# Patient Record
Sex: MALE | Race: WHITE | NOT HISPANIC OR LATINO | Employment: OTHER | URBAN - METROPOLITAN AREA
[De-identification: names, ages, dates, MRNs, and addresses within clinical notes are randomized per-mention and may not be internally consistent; named-entity substitution may affect disease eponyms.]

---

## 2017-01-16 RX ORDER — LISINOPRIL AND HYDROCHLOROTHIAZIDE 20; 12.5 MG/1; MG/1
TABLET ORAL
Qty: 30 TABLET | Refills: 2 | Status: SHIPPED | OUTPATIENT
Start: 2017-01-16 | End: 2017-09-01 | Stop reason: SDUPTHER

## 2017-01-31 ENCOUNTER — DOCUMENTATION (OUTPATIENT)
Dept: PHYSICAL THERAPY | Facility: HOSPITAL | Age: 77
End: 2017-01-31

## 2017-01-31 DIAGNOSIS — M25.511 RIGHT ANTERIOR SHOULDER PAIN: Primary | ICD-10-CM

## 2017-09-01 RX ORDER — LISINOPRIL AND HYDROCHLOROTHIAZIDE 20; 12.5 MG/1; MG/1
1 TABLET ORAL DAILY
Qty: 30 TABLET | Refills: 1 | Status: SHIPPED | OUTPATIENT
Start: 2017-09-01 | End: 2017-12-02 | Stop reason: SDUPTHER

## 2017-09-01 RX ORDER — SIMVASTATIN 20 MG
TABLET ORAL
Qty: 30 TABLET | Refills: 1 | Status: SHIPPED | OUTPATIENT
Start: 2017-09-01 | End: 2017-11-01 | Stop reason: SDUPTHER

## 2017-09-06 ENCOUNTER — HOSPITAL ENCOUNTER (EMERGENCY)
Facility: HOSPITAL | Age: 77
Discharge: HOME | End: 2017-09-06
Attending: EMERGENCY MEDICINE | Admitting: EMERGENCY MEDICINE

## 2017-09-06 ENCOUNTER — APPOINTMENT (OUTPATIENT)
Dept: GENERAL RADIOLOGY | Facility: HOSPITAL | Age: 77
End: 2017-09-06

## 2017-09-06 VITALS
RESPIRATION RATE: 18 BRPM | SYSTOLIC BLOOD PRESSURE: 156 MMHG | HEIGHT: 70 IN | WEIGHT: 215 LBS | OXYGEN SATURATION: 94 % | TEMPERATURE: 97.4 F | BODY MASS INDEX: 30.78 KG/M2 | DIASTOLIC BLOOD PRESSURE: 62 MMHG | HEART RATE: 60 BPM

## 2017-09-06 DIAGNOSIS — S40.022A CONTUSION OF LEFT UPPER ARM, INITIAL ENCOUNTER: Primary | ICD-10-CM

## 2017-09-06 DIAGNOSIS — S46.912A STRAIN OF LEFT UPPER ARM, INITIAL ENCOUNTER: ICD-10-CM

## 2017-09-06 PROCEDURE — 73060 X-RAY EXAM OF HUMERUS: CPT

## 2017-09-06 PROCEDURE — 99282 EMERGENCY DEPT VISIT SF MDM: CPT

## 2017-09-06 RX ORDER — NAPROXEN 500 MG/1
500 TABLET ORAL 2 TIMES DAILY PRN
Qty: 24 TABLET | Refills: 0 | Status: SHIPPED | OUTPATIENT
Start: 2017-09-06 | End: 2018-02-28 | Stop reason: SINTOL

## 2017-09-06 NOTE — DISCHARGE INSTRUCTIONS
Rest.  Use sling as needed.  No use of left arm until better.  Naproxen as prescribed for pain.  Apply ice bag off/on next 24 hrs, then switch to heat.  Follow up with Monica and/or Dr. Parrish.

## 2017-09-06 NOTE — ED PROVIDER NOTES
Subjective   HPI Comments: 77-year-old male presents to the emergency department for evaluation of left upper arm pain after a fall.  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  He denies any other injury.  He has pain in the left upper arm on movement of the left arm.  He is left-hand dominant.  Past medical history of hypertension and coronary artery disease with a single stent.  He states he had a heart catheter last year that was normal.    Patient is a 77 y.o. male presenting with fall.   History provided by:  Patient  Fall   Mechanism of injury: fall    Injury location: left upper arm.  Time since incident: 12:55 today.  Fall:     Fall occurred: at work, landed on left shoulder against pavement.    Height of fall:  Standing height    Impact surface:  [place]    Point of impact: left upper arm.  Prior to arrival data:     Patient ambulatory at scene: yes      Responsiveness at scene:  Alert    Loss of consciousness: no    Associated symptoms: no abdominal pain, no back pain, no chest pain, no headaches, no nausea and no vomiting        Review of Systems   Constitutional: Negative for chills and fever.   HENT: Negative for congestion, ear pain, nosebleeds, rhinorrhea and sore throat.    Eyes: Negative for pain, discharge and visual disturbance.   Respiratory: Negative for shortness of breath and wheezing.    Cardiovascular: Negative for chest pain, palpitations and leg swelling.   Gastrointestinal: Negative for abdominal pain, blood in stool, diarrhea, nausea and vomiting.   Endocrine: Negative.    Genitourinary: Negative for dysuria, hematuria and urgency.   Musculoskeletal: Negative for arthralgias and back pain.        Left upper pain     Skin: Negative for pallor, rash and wound.   Allergic/Immunologic: Negative for immunocompromised state.   Neurological: Negative for dizziness, speech  difficulty, weakness and headaches.   Hematological: Negative for adenopathy. Does not bruise/bleed easily.   Psychiatric/Behavioral: Negative.        Past Medical History:   Diagnosis Date   • Hyperlipidemia    • Hypertension    • Ischemic heart disease    • Tobacco use     Remote tobacco use. Quit in 1978.       No Known Allergies    Past Surgical History:   Procedure Laterality Date   • CORONARY ANGIOPLASTY WITH STENT PLACEMENT Left 07/16/2010    70% RCA stenosis treated with 3.0 x 18 mm Cordis Cypher IRENA; LVEF 78%.        History reviewed. No pertinent family history.    Social History     Social History   • Marital status:      Spouse name: N/A   • Number of children: N/A   • Years of education: N/A     Social History Main Topics   • Smoking status: Former Smoker     Packs/day: 0.50     Years: 20.00     Types: Cigarettes     Quit date: 6/1/1978   • Smokeless tobacco: None   • Alcohol use None   • Drug use: None   • Sexual activity: Not Asked     Other Topics Concern   • None     Social History Narrative           Objective   Physical Exam   Constitutional: He is oriented to person, place, and time. He appears well-developed and well-nourished. No distress.   HENT:   Head: Normocephalic and atraumatic.   Nose: Nose normal.   Mouth/Throat: Oropharynx is clear and moist.   Eyes: EOM are normal. Pupils are equal, round, and reactive to light. No scleral icterus.   Neck: Normal range of motion. Neck supple.   Cardiovascular: Normal rate, regular rhythm, normal heart sounds and intact distal pulses.    No murmur heard.  Pulmonary/Chest: Effort normal and breath sounds normal. No respiratory distress. He has no wheezes. He has no rales. He exhibits no tenderness.   Abdominal: Soft. Bowel sounds are normal. There is no tenderness.   Musculoskeletal: Normal range of motion. He exhibits tenderness (tenderness on palpation over left upper arm;  pain on ROM.  No deformity.  No abrasions or bruising.). He exhibits no  edema.   Neurological: He is alert and oriented to person, place, and time.   Skin: Skin is warm and dry. No rash noted. He is not diaphoretic.   Psychiatric: He has a normal mood and affect.   Nursing note and vitals reviewed.      Procedures         ED Course  ED Course   6:13 PM  No acute findings on xray.  Will d/c pt in sling and give rx for Naproxen and have f/u with [facility] and/or ortho.              MDM    Final diagnoses:   Contusion of left upper arm, initial encounter   Strain of left upper arm, initial encounter            GRACE García  09/06/17 [id number]

## 2017-09-11 ENCOUNTER — TRANSCRIBE ORDERS (OUTPATIENT)
Dept: PHYSICAL THERAPY | Facility: CLINIC | Age: 77
End: 2017-09-11

## 2017-09-11 DIAGNOSIS — M25.512 ACUTE PAIN OF LEFT SHOULDER: Primary | ICD-10-CM

## 2017-09-11 DIAGNOSIS — M54.2 CERVICAL PAIN: ICD-10-CM

## 2017-09-13 ENCOUNTER — TREATMENT (OUTPATIENT)
Dept: PHYSICAL THERAPY | Facility: CLINIC | Age: 77
End: 2017-09-13

## 2017-09-13 DIAGNOSIS — M25.512 ACUTE PAIN OF LEFT SHOULDER: Primary | ICD-10-CM

## 2017-09-13 PROCEDURE — 97110 THERAPEUTIC EXERCISES: CPT | Performed by: PHYSICAL THERAPIST

## 2017-09-13 PROCEDURE — 97001 PR PHYS THERAPY EVALUATION: CPT | Performed by: PHYSICAL THERAPIST

## 2017-09-13 NOTE — PROGRESS NOTES
Physical Therapy Initial Evaluation and Plan of Care    TOTAL TIME: 60 MINUTES    Subjective Evaluation    History of Present Illness  Date of onset: 2017  Mechanism of injury: WENT TO ER AFTER A FALL ON TO THE LEFT SHOULDER    PAIN WITH ATTEMPTING TO RAISE ARM        Patient Occupation: WORKS FOR [employer] Work Restrictions: OFF WORKQuality of life: fair    Pain  Current pain ratin  At best pain ratin  At worst pain ratin  Relieving factors: medications, change in position, rest, ice and heat  Aggravating factors: sleeping    Diagnostic Tests  X-ray: normal    Patient Goals  Patient goals for therapy: decreased pain, return to work, independence with ADLs/IADLs, increased strength and increased motion             Objective     Palpation   Left   Tenderness of the infraspinatus and supraspinatus.     Tenderness     Left Shoulder   Tenderness in the infraspinatus tendon and supraspinatus tendon. No tenderness in the AC joint, bicipital groove, lateral scapula, medial scapula, scapular spine and SC joint.     Cervical/Thoracic Screen   Cervical range of motion within normal limits    Neurological Testing     Reflexes   Left   Biceps (C5/C6): trace (1+)  Brachioradialis (C6): trace (1+)  Triceps (C7): trace (1+)    Right   Biceps (C5/C6): trace (1+)  Brachioradialis (C6): trace (1+)  Triceps (C7): trace (1+)    Active Range of Motion   Left Shoulder   Flexion: 100 degrees with pain  Abduction: 45 degrees with pain  External rotation 0°: 60 degrees with pain  External rotation BTH: C6 with pain  Internal rotation BTB: L1     Strength/Myotome Testing     Left Shoulder     Planes of Motion   Flexion: 2-   Abduction: 2-   External rotation at 0°: 2   Internal rotation at 0°: 4     Tests     Left Shoulder   Positive active compression ([place]), empty can and painful arc.   Negative drop arm and lift-off.          Assessment & Plan     Assessment  Impairments: abnormal or restricted  ROM, activity intolerance, impaired physical strength, lacks appropriate home exercise program and pain with function  Assessment details: S/S CONSISTENT WITH RTC STRAIN LEFT SHOULDER, POSSIBLE TEAR AFTER A FALL; NEEDS PT TO RESTORE FULL ROM AND STRENGTH IN ORDER TO RTW WITH FULL FUNCTION    Prognosis: poor  Prognosis details: DIFFICULTY WITH AROM OF ARM, VERY WEAK RIGHT EXT ROT STRENGTH-MAY INDICATE RTC TEAR  Functional Limitations: carrying objects, lifting, pulling, pushing, uncomfortable because of pain, reaching behind back and reaching overhead  Goals  Plan Goals: GOALS:  2 WEEKS  1. IND WITH HEP  2. FULL AROM WITHOUT PAIN\    4 WEEKS:  1. ABLE TO DEMONSTRATE 4/5 LEFT SHOULDER STRENGTH  2. ABLE TO PERFORM WORK SIMULATION TASKS SUCH AS DRIVING, LIFTING, CARRYING WITHOUT PAIN    Plan  Therapy options: will be seen for skilled physical therapy services  Planned modality interventions: iontophoresis, TENS, ultrasound, thermotherapy (hydrocollator packs), high voltage pulsed current (pain management), electrical stimulation/Russian stimulation and cryotherapy  Other planned modality interventions: DRY NEEDLING  Planned therapy interventions: manual therapy, soft tissue mobilization, strengthening, spinal/joint mobilization, stretching, therapeutic activities, joint mobilization, functional ROM exercises, flexibility and neuromuscular re-education  Frequency: 2x week  Duration in weeks: 4  Treatment plan discussed with: patient and caregiver        Manual Therapy:         mins  86334;  Therapeutic Exercise:    30     mins  79355;     Neuromuscular Rahel:        mins  80085;    Therapeutic Activity:          mins  64524;     Gait Training:           mins  68416;     Ultrasound:          mins  10729;    Electrical Stimulation:         mins  52531 ( );  Dry Needling          mins self-pay    Timed Treatment:   30   mins   Total Treatment:     60   mins    PT SIGNATURE: Melvin Rodgers, DA   DATE TREATMENT  INITIATED: 9/13/2017    Initial Certification  Certification Period: 12/12/2017  I certify that the therapy services are furnished while this patient is under my care.  The services outlined above are required by this patient, and will be reviewed every 90 days.     PHYSICIAN: Storm Stevenson MD      DATE:     Please sign and return via fax to  .. Thank you, [facility].

## 2017-09-15 ENCOUNTER — TREATMENT (OUTPATIENT)
Dept: PHYSICAL THERAPY | Facility: CLINIC | Age: 77
End: 2017-09-15

## 2017-09-15 DIAGNOSIS — M25.512 ACUTE PAIN OF LEFT SHOULDER: Primary | ICD-10-CM

## 2017-09-15 PROCEDURE — 97110 THERAPEUTIC EXERCISES: CPT | Performed by: PHYSICAL THERAPIST

## 2017-09-18 ENCOUNTER — TREATMENT (OUTPATIENT)
Dept: PHYSICAL THERAPY | Facility: CLINIC | Age: 77
End: 2017-09-18

## 2017-09-18 DIAGNOSIS — M25.512 ACUTE PAIN OF LEFT SHOULDER: Primary | ICD-10-CM

## 2017-09-18 PROCEDURE — 97110 THERAPEUTIC EXERCISES: CPT | Performed by: PHYSICAL THERAPIST

## 2017-09-18 PROCEDURE — 97140 MANUAL THERAPY 1/> REGIONS: CPT | Performed by: PHYSICAL THERAPIST

## 2017-09-18 NOTE — PROGRESS NOTES
Physical Therapy Daily Progress Note    TOTAL TIME:  MINUTES    Jef Fairbanksaniel reports: SHOULDER WAS SORE AFTER LAST TREATMENT BUT HAS FELT FINE THE LAST COUPLE OF DAYS; SEE THE MD AGAIN ON Wednesday       Objective   See Exercise, Manual, and Modality Logs for complete treatment.     THERAPEUTIC EXERCISES/ACTIVITIES ADDED TODAY: SHOULDER ISOMETRICS ADD/ER/FLEXION; UBE FWD/RETRO    MANUAL PROM LEFT SHOULDER X 10 MINUTES    Assessment/Plan  SUSPICIOUS OF POSSIBLE RTC TEAR - GREAT DIFFICULTY TO RAISE ARM AND VERY WEAK RTC WITH ER STRENGTH; GOOD PROM    Progress per Plan of Care           Manual Therapy:    10     mins  53684;  Therapeutic Exercise:    40     mins  29198;     Neuromuscular Rahel:        mins  92468;    Therapeutic Activity:          mins  36336;     Gait Training:           mins  41918;     Ultrasound:          mins  38052;    Electrical Stimulation:         mins  01311 ( );  Dry Needling          mins self-pay    Timed Treatment:   50   mins   Total Treatment:     60   mins    Melvin Rodgers, PT  Physical Therapist

## 2017-09-20 ENCOUNTER — TREATMENT (OUTPATIENT)
Dept: PHYSICAL THERAPY | Facility: CLINIC | Age: 77
End: 2017-09-20

## 2017-09-20 DIAGNOSIS — M25.512 ACUTE PAIN OF LEFT SHOULDER: Primary | ICD-10-CM

## 2017-09-20 PROCEDURE — 97110 THERAPEUTIC EXERCISES: CPT | Performed by: PHYSICAL THERAPIST

## 2017-09-20 NOTE — PROGRESS NOTES
Physical Therapy Daily Progress Note    TOTAL TIME: 50 MINUTES    Jef Mcarthur reports: SHOULDER A LITTLE SORE THIS AM; PAIN SEEMS TO HAVE MOVED FROM ARM UP IN TO THE SHOULDER NOW; F/U WITH THE MD THIS AM      Objective   See Exercise, Manual, and Modality Logs for complete treatment.     THERAPEUTIC EXERCISES/ACTIVITIES ADDED TODAY: BALL ON WALL WALKS, YELLOW TBAND FLEXION, ADD, EXT    Assessment/Plan  ABLE TO PERFORM YELLOW TBAND TODAY WITHOUT PAIN; PATIENT NEEDS CONTINUED PHYSICAL THERAPY IN ORDER TO ACHIEVE FULL ROM, STRENGTH AND FUNCTION WITH NO REPORTS OF PAIN IN ORDER TO RTW WITHOUT RESTRICTIONS AND WITHOUT PAIN OR DYSFUNCTION       Progress per Plan of Care           Manual Therapy:         mins  67196;  Therapeutic Exercise:    50     mins  06153;     Neuromuscular Rahel:        mins  47495;    Therapeutic Activity:          mins  23429;     Gait Training:           mins  21880;     Ultrasound:          mins  05001;    Electrical Stimulation:         mins  78405 ( );  Dry Needling          mins self-pay    Timed Treatment:   50   mins   Total Treatment:     50   mins    Melvin Rodgers PT  Physical Therapist

## 2017-09-22 ENCOUNTER — TREATMENT (OUTPATIENT)
Dept: PHYSICAL THERAPY | Facility: CLINIC | Age: 77
End: 2017-09-22

## 2017-09-22 DIAGNOSIS — M25.512 ACUTE PAIN OF LEFT SHOULDER: Primary | ICD-10-CM

## 2017-09-22 PROCEDURE — 97110 THERAPEUTIC EXERCISES: CPT | Performed by: PHYSICAL THERAPIST

## 2017-09-22 NOTE — PROGRESS NOTES
Physical Therapy Daily Progress Note    TOTAL TIME: 60 MINUTES    Jef Mcarthur reports: SHOULDER IS STARTING TO FEEL A LITTLE BIT BETTER      Objective   See Exercise, Manual, and Modality Logs for complete treatment.     THERAPEUTIC EXERCISES/ACTIVITIES ADDED TODAY: NA    Assessment/Plan  PATIENT HAS FEWER COMPLAINTS OF PAIN; FUNCTION IMPROVING SLOWLY; PATIENT NEEDS CONTINUED PHYSICAL THERAPY IN ORDER TO ACHIEVE FULL ROM, STRENGTH AND FUNCTION WITH NO REPORTS OF PAIN IN ORDER TO RTW WITHOUT RESTRICTIONS AND WITHOUT PAIN OR DYSFUNCTION       Progress per Plan of Care           Manual Therapy:         mins  44393;  Therapeutic Exercise:    50     mins  53471;     Neuromuscular Rahel:        mins  38300;    Therapeutic Activity:          mins  67549;     Gait Training:           mins  89689;     Ultrasound:          mins  35175;    Electrical Stimulation:         mins  12792 ( );  Dry Needling          mins self-pay    Timed Treatment:   50   mins   Total Treatment:     60   mins    Melvin Rodgers PT  Physical Therapist

## 2017-09-27 ENCOUNTER — TREATMENT (OUTPATIENT)
Dept: PHYSICAL THERAPY | Facility: CLINIC | Age: 77
End: 2017-09-27

## 2017-09-27 PROCEDURE — 97110 THERAPEUTIC EXERCISES: CPT | Performed by: PHYSICAL THERAPIST

## 2017-09-27 NOTE — PROGRESS NOTES
Physical Therapy Daily Progress Note    TOTAL TIME: 80 MINUTES    Jef Mcarthur reports: HAD TO CANCEL ON Monday, WIFE WAS ILL; SHOULDER SEEMS TO BE IMPROVING BUT STILL PAINFUL AT TIMES      Objective   See Exercise, Manual, and Modality Logs for complete treatment.     THERAPEUTIC EXERCISES/ACTIVITIES ADDED TODAY: INCLINE SLIDES ON WALL WITH BOARD (FLEXION AND ADD), SL ER 0#, SUPINE CANE ROM MULTIPLE ANGLES    Assessment/Plan  MAKING GOOD PROGRESS, WORKING HARD IN THERAPY; PATIENT NEEDS CONTINUED PHYSICAL THERAPY IN ORDER TO ACHIEVE FULL ROM, STRENGTH AND FUNCTION WITH NO REPORTS OF PAIN IN ORDER TO RTW WITHOUT RESTRICTIONS AND WITHOUT PAIN OR DYSFUNCTION         Progress per Plan of Care           Manual Therapy:    10     mins  65929;  Therapeutic Exercise:    60     mins  29816;     Neuromuscular Rahel:        mins  54030;    Therapeutic Activity:          mins  39628;     Gait Training:           mins  35421;     Ultrasound:          mins  09961;    Electrical Stimulation:         mins  45130 ( );  Dry Needling          mins self-pay    Timed Treatment:   70   mins   Total Treatment:     80   mins    Melvin Rodgers, PT  Physical Therapist

## 2017-09-29 ENCOUNTER — TREATMENT (OUTPATIENT)
Dept: PHYSICAL THERAPY | Facility: CLINIC | Age: 77
End: 2017-09-29

## 2017-09-29 DIAGNOSIS — M25.512 ACUTE PAIN OF LEFT SHOULDER: Primary | ICD-10-CM

## 2017-09-29 PROCEDURE — 97140 MANUAL THERAPY 1/> REGIONS: CPT | Performed by: PHYSICAL THERAPIST

## 2017-09-29 PROCEDURE — 97110 THERAPEUTIC EXERCISES: CPT | Performed by: PHYSICAL THERAPIST

## 2017-10-02 ENCOUNTER — TREATMENT (OUTPATIENT)
Dept: PHYSICAL THERAPY | Facility: CLINIC | Age: 77
End: 2017-10-02

## 2017-10-02 DIAGNOSIS — M25.512 ACUTE PAIN OF LEFT SHOULDER: Primary | ICD-10-CM

## 2017-10-02 PROCEDURE — 97110 THERAPEUTIC EXERCISES: CPT | Performed by: PHYSICAL THERAPIST

## 2017-10-02 NOTE — PROGRESS NOTES
Physical Therapy Daily Progress Note    TOTAL TIME: 70 MINUTES    Jef Mcarthur reports: CONTINUED SHOULDER PAIN, BUT OVERALL BETTER      Objective   See Exercise, Manual, and Modality Logs for complete treatment.     THERAPEUTIC EXERCISES/ACTIVITIES ADDED TODAY: NA    Assessment/Plan  RTC WEAKNESS, PAIN WITH SOME ELEVATION AND ABDUCTION TASKS    Progress per Plan of Care           Manual Therapy:    10     mins  77852;  Therapeutic Exercise:    50     mins  74633;     Neuromuscular Rahel:        mins  24043;    Therapeutic Activity:          mins  71008;     Gait Training:           mins  85259;     Ultrasound:          mins  39151;    Electrical Stimulation:         mins  08285 ( );  Dry Needling          mins self-pay    Timed Treatment:   60   mins   Total Treatment:     70   mins    Melvin Rodgers, PT  Physical Therapist

## 2017-10-03 NOTE — PROGRESS NOTES
Physical Therapy Daily Progress Note    TOTAL TIME: 90 MINUTES    Jef Mcarthur reports: SHOULDER STILL VERY WEAK, SORE AT TIMES; F/U WITH MD ON WEDNESDAY      Objective   See Exercise, Manual, and Modality Logs for complete treatment.     THERAPEUTIC EXERCISES/ACTIVITIES ADDED TODAY: CANE SUPINE ROM MULTIPLE ANGLES    Assessment/Plan  WEAKNESS WITH ACTIVE FLEXION AND ER MAY INDICATE RTC PATHOLOGY; PATIENT NEEDS CONTINUED PHYSICAL THERAPY IN ORDER TO ACHIEVE FULL ROM, STRENGTH AND FUNCTION WITH NO REPORTS OF PAIN IN ORDER TO RTW WITHOUT RESTRICTIONS AND WITHOUT PAIN OR DYSFUNCTION       Progress per Plan of Care           Manual Therapy:         mins  55970;  Therapeutic Exercise:    75     mins  07719;     Neuromuscular Rahel:        mins  25039;    Therapeutic Activity:          mins  69411;     Gait Training:           mins  65826;     Ultrasound:          mins  29493;    Electrical Stimulation:         mins  59952 ( );  Dry Needling          mins self-pay    Timed Treatment:   75   mins   Total Treatment:     90   mins    Melvin Rodgers, PT  Physical Therapist

## 2017-10-30 ENCOUNTER — OFFICE VISIT (OUTPATIENT)
Dept: ORTHOPEDIC SURGERY | Facility: CLINIC | Age: 77
End: 2017-10-30

## 2017-10-30 VITALS
HEIGHT: 67 IN | SYSTOLIC BLOOD PRESSURE: 156 MMHG | BODY MASS INDEX: 33.43 KG/M2 | WEIGHT: 213 LBS | DIASTOLIC BLOOD PRESSURE: 72 MMHG | HEART RATE: 69 BPM

## 2017-10-30 DIAGNOSIS — S46.012A RUPTURE OF LEFT SUPRASPINATUS TENDON, INITIAL ENCOUNTER: ICD-10-CM

## 2017-10-30 DIAGNOSIS — R52 PAIN: Primary | ICD-10-CM

## 2017-10-30 PROCEDURE — 99204 OFFICE O/P NEW MOD 45 MIN: CPT | Performed by: ORTHOPAEDIC SURGERY

## 2017-10-30 RX ORDER — ACETAMINOPHEN 500 MG
1000 TABLET ORAL DAILY
COMMUNITY

## 2017-11-01 RX ORDER — SIMVASTATIN 20 MG
TABLET ORAL
Qty: 30 TABLET | Refills: 0 | Status: SHIPPED | OUTPATIENT
Start: 2017-11-01 | End: 2017-12-02 | Stop reason: SDUPTHER

## 2017-11-06 ENCOUNTER — TREATMENT (OUTPATIENT)
Dept: PHYSICAL THERAPY | Facility: CLINIC | Age: 77
End: 2017-11-06

## 2017-11-06 DIAGNOSIS — M25.512 ACUTE PAIN OF LEFT SHOULDER: Primary | ICD-10-CM

## 2017-11-06 PROCEDURE — 97110 THERAPEUTIC EXERCISES: CPT | Performed by: PHYSICAL THERAPIST

## 2017-11-06 NOTE — PROGRESS NOTES
Physical Therapy Daily Progress Note/ RE-ASSESSMENT     TOTAL TIME: 75 MINUTES    Jef Mcarthur reports: very pleasant gentleman returns to PT today after getting an MRI and seeing Dr. Juno Ochoa for left shoulder pain after a fall at work; works for [employer] as a       Objective   See Exercise, Manual, and Modality Logs for complete treatment.     AROM:   scaption 140 degrees  IR: T6  ABD: 150 degrees  ER: T3 behind the head     strength: 70 # bilaterally (mean for age and gender is 65.7 on right, 55# on left)    MMT: shoulder flexion/abd/IR all 4/5, ER + 2/5    THERAPEUTIC EXERCISES/ACTIVITIES ADDED TODAY: see flow sheets    Assessment/Plan  MRI reveals SS/IS massive cuff tears but patient's ROM and strength have improved greatly since starting therapy; needs continue PT to address strength and function    Goals:   1. MMT of ER to > 3+/5 without pain  2. Full AROM left shoulder with functional movements without pain or compensation  3. MMT flexion, IR, ABD to 5/5   4. Able to perform ADL's without pain or compensation    Progress per Plan of Care           Manual Therapy:         mins  93303;  Therapeutic Exercise:    55     mins  77561;     Neuromuscular Rahel:        mins  92555;    Therapeutic Activity:          mins  68067;     Gait Training:           mins  71128;     Ultrasound:          mins  38148;    Electrical Stimulation:         mins  19049 ( );  Dry Needling          mins self-pay    Timed Treatment:   55   mins   Total Treatment:     75   mins    Melvin Rodgers, PT  Physical Therapist

## 2017-11-08 ENCOUNTER — TREATMENT (OUTPATIENT)
Dept: PHYSICAL THERAPY | Facility: CLINIC | Age: 77
End: 2017-11-08

## 2017-11-08 DIAGNOSIS — M25.512 ACUTE PAIN OF LEFT SHOULDER: Primary | ICD-10-CM

## 2017-11-08 PROCEDURE — 97110 THERAPEUTIC EXERCISES: CPT | Performed by: PHYSICAL THERAPIST

## 2017-11-08 NOTE — PROGRESS NOTES
Physical Therapy Daily Progress Note    TOTAL TIME: 80 MINUTES    Jef Mcarthur reports: no new complaints; shoulder feels a little stronger      Objective   See Exercise, Manual, and Modality Logs for complete treatment.     THERAPEUTIC EXERCISES/ACTIVITIES ADDED TODAY: see flow sheets    Assessment/Plan  Working very hard in therapy, making good progress thus far; needs to continue with PT towards established goals    Progress per Plan of Care           Manual Therapy:         mins  29420;  Therapeutic Exercise:    80     mins  89208;     Neuromuscular Rahel:        mins  28410;    Therapeutic Activity:          mins  90738;     Gait Training:           mins  08842;     Ultrasound:          mins  28029;    Electrical Stimulation:         mins  15114 ( );  Dry Needling          mins self-pay    Timed Treatment:   80   mins   Total Treatment:     80   mins    Melvin Rodgers, PT  Physical Therapist

## 2017-11-13 ENCOUNTER — TREATMENT (OUTPATIENT)
Dept: PHYSICAL THERAPY | Facility: CLINIC | Age: 77
End: 2017-11-13

## 2017-11-13 DIAGNOSIS — M25.512 ACUTE PAIN OF LEFT SHOULDER: Primary | ICD-10-CM

## 2017-11-13 PROCEDURE — 97110 THERAPEUTIC EXERCISES: CPT | Performed by: PHYSICAL THERAPIST

## 2017-11-13 NOTE — PROGRESS NOTES
Physical Therapy Daily Progress Note    TOTAL TIME: 75 MINUTES    Jef Mcarthur reports: feels stronger; continued intermittent soreness      Objective   See Exercise, Manual, and Modality Logs for complete treatment.     THERAPEUTIC EXERCISES/ACTIVITIES ADDED TODAY: 2# med ball on wall taps 3 x 1 minute; swiss ball AROM in scapular plane through full range 2x10 bilaterally    Assessment/Plan  Patient is working very hard in therapy and making good progress; continued ER weakness with arm at side; good AROM left UE; could benefit from continued PT to improve strength and function    Progress per Plan of Care           Manual Therapy:         mins  81707;  Therapeutic Exercise:    75     mins  97828;     Neuromuscular Rahel:        mins  56377;    Therapeutic Activity:          mins  47620;     Gait Training:           mins  61997;     Ultrasound:          mins  89320;    Electrical Stimulation:         mins  31781 ( );  Dry Needling          mins self-pay    Timed Treatment:   75   mins   Total Treatment:     75   mins    Melvin Rodgers PT  Physical Therapist

## 2017-11-15 ENCOUNTER — TREATMENT (OUTPATIENT)
Dept: PHYSICAL THERAPY | Facility: CLINIC | Age: 77
End: 2017-11-15

## 2017-11-15 DIAGNOSIS — M25.512 ACUTE PAIN OF LEFT SHOULDER: Primary | ICD-10-CM

## 2017-11-15 PROCEDURE — 97110 THERAPEUTIC EXERCISES: CPT | Performed by: PHYSICAL THERAPIST

## 2017-11-15 NOTE — PROGRESS NOTES
Physical Therapy Daily Progress Note    TOTAL TIME: 75 MINUTES    Jef Mcarthur reports: will see the MD on Monday; arm feels stronger, continued weakness in ER      Objective   See Exercise, Manual, and Modality Logs for complete treatment.     THERAPEUTIC EXERCISES/ACTIVITIES ADDED TODAY: see flow sheets    Assessment/Plan  Very pleasant gentleman who is working very hard in therapy to improve the function of his left shoulder; good AROM all planes; weakness with ER with arm at the side; has made excellent progress with therapy; could continue with therapy and progress to work simulated tasks, as patient voices that he would like to return to work if possible; would likely have difficulty with overhead tasks    Awaiting MD orders           Manual Therapy:         mins  78326;  Therapeutic Exercise:    75     mins  32262;     Neuromuscular Rahel:        mins  84827;    Therapeutic Activity:          mins  58852;     Gait Training:           mins  38726;     Ultrasound:          mins  17839;    Electrical Stimulation:         mins  64010 ( );  Dry Needling          mins self-pay    Timed Treatment:   75   mins   Total Treatment:     75   mins    Melvin Rodgers PT  Physical Therapist

## 2017-11-17 ENCOUNTER — OFFICE VISIT (OUTPATIENT)
Dept: FAMILY MEDICINE CLINIC | Facility: CLINIC | Age: 77
End: 2017-11-17

## 2017-11-17 VITALS
SYSTOLIC BLOOD PRESSURE: 144 MMHG | TEMPERATURE: 96.5 F | HEART RATE: 62 BPM | WEIGHT: 219.8 LBS | OXYGEN SATURATION: 98 % | HEIGHT: 67 IN | DIASTOLIC BLOOD PRESSURE: 82 MMHG | BODY MASS INDEX: 34.5 KG/M2

## 2017-11-17 DIAGNOSIS — E66.09 CLASS 1 OBESITY DUE TO EXCESS CALORIES WITHOUT SERIOUS COMORBIDITY WITH BODY MASS INDEX (BMI) OF 34.0 TO 34.9 IN ADULT: ICD-10-CM

## 2017-11-17 DIAGNOSIS — G89.29 CHRONIC MIDLINE LOW BACK PAIN, WITH SCIATICA PRESENCE UNSPECIFIED: Primary | ICD-10-CM

## 2017-11-17 DIAGNOSIS — M54.5 CHRONIC MIDLINE LOW BACK PAIN, WITH SCIATICA PRESENCE UNSPECIFIED: Primary | ICD-10-CM

## 2017-11-17 DIAGNOSIS — G89.29 CHRONIC LEFT SHOULDER PAIN: ICD-10-CM

## 2017-11-17 DIAGNOSIS — Z23 NEED FOR IMMUNIZATION AGAINST INFLUENZA: ICD-10-CM

## 2017-11-17 DIAGNOSIS — M25.512 CHRONIC LEFT SHOULDER PAIN: ICD-10-CM

## 2017-11-17 DIAGNOSIS — R73.09 ELEVATED GLUCOSE: ICD-10-CM

## 2017-11-17 PROBLEM — E66.811 CLASS 1 OBESITY DUE TO EXCESS CALORIES WITHOUT SERIOUS COMORBIDITY WITH BODY MASS INDEX (BMI) OF 34.0 TO 34.9 IN ADULT: Status: ACTIVE | Noted: 2017-11-17

## 2017-11-17 LAB — HBA1C MFR BLD: 5.5 %

## 2017-11-17 PROCEDURE — 83036 HEMOGLOBIN GLYCOSYLATED A1C: CPT | Performed by: NURSE PRACTITIONER

## 2017-11-17 PROCEDURE — 90662 IIV NO PRSV INCREASED AG IM: CPT | Performed by: NURSE PRACTITIONER

## 2017-11-17 PROCEDURE — 99214 OFFICE O/P EST MOD 30 MIN: CPT | Performed by: NURSE PRACTITIONER

## 2017-11-17 PROCEDURE — 90471 IMMUNIZATION ADMIN: CPT | Performed by: NURSE PRACTITIONER

## 2017-11-17 NOTE — PROGRESS NOTES
Chief Complaint   Patient presents with   • Shoulder Pain     Left shoulder    • Sciatica   • Hyperglycemia     Labs showed glucose was a little elevated. A1c check.       Subjective   Jef Mcarthur is a 77 y.o. male    Hyperglycemia   This is a new problem. Episode onset: last Glucose, was elevated, 104, Was 112 time before. Checked A1c today, was 5.5. The problem has been unchanged. Associated symptoms include arthralgias. Pertinent negatives include no abdominal pain, chest pain, chills, coughing, fatigue, headaches, nausea, rash or weakness. Associated symptoms comments: No symptoms. Nothing aggravates the symptoms. He has tried nothing for the symptoms. The treatment provided no relief.     Left Shoulder pain- Seeing ortho, Has torn rotator cuff    Right Sciatica- Has been there since at least 12/2016, has been to [facility] and used NSAIDs which have improved pain.     Anxiety- Has brother in law, homeless, lives with them off and on, Hiked and fractured leg, Now been with them for 6 weeks, He has drinking problem, Has been with them off and on for last 3 yrs. Causing some problems with him and wife.       No Known Allergies  Past Medical History:   Diagnosis Date   • Anxiety    • Colon polyps    • Hyperlipidemia    • Hypertension    • Ischemic heart disease    • Tobacco use     Remote tobacco use. Quit in 1978.      Past Surgical History:   Procedure Laterality Date   • CORONARY ANGIOPLASTY WITH STENT PLACEMENT Left 07/16/2010    70% RCA stenosis treated with 3.0 x 18 mm Cordis Cypher IRENA; LVEF 78%.    • TONSILLECTOMY       Social History     Social History   • Marital status:      Spouse name: N/A   • Number of children: N/A   • Years of education: N/A     Occupational History   • Not on file.     Social History Main Topics   • Smoking status: Former Smoker     Packs/day: 0.50     Years: 17.00     Types: Cigarettes     Start date: 1961     Quit date: 6/1/1978   • Smokeless tobacco: Never Used   •  Alcohol use Yes      Comment: rare   • Drug use: No   • Sexual activity: Defer     Other Topics Concern   • Not on file     Social History Narrative        Current Outpatient Prescriptions:   •  acetaminophen (TYLENOL) 500 MG tablet, Take 1,000 mg by mouth Daily., Disp: , Rfl:   •  aspirin 81 MG EC tablet, Take 81 mg by mouth daily., Disp: , Rfl:   •  lisinopril-hydrochlorothiazide (PRINZIDE,ZESTORETIC) 20-12.5 MG per tablet, Take 1 tablet by mouth Daily., Disp: 30 tablet, Rfl: 1  •  naproxen (NAPROSYN) 500 MG tablet, Take 1 tablet by mouth 2 (Two) Times a Day As Needed for Mild Pain ., Disp: 24 tablet, Rfl: 0  •  pantoprazole (PROTONIX) 40 MG EC tablet, TAKE ONE TABLET BY MOUTH DAILY, Disp: 30 tablet, Rfl: 4  •  simvastatin (ZOCOR) 20 MG tablet, TAKE ONE TABLET BY MOUTH DAILY, Disp: 30 tablet, Rfl: 0     Review of Systems   Constitutional: Negative for chills, fatigue and unexpected weight change.   HENT: Positive for hearing loss.    Respiratory: Negative for cough, chest tightness and shortness of breath.    Cardiovascular: Negative for chest pain, palpitations and leg swelling.   Gastrointestinal: Positive for constipation (some better with ficercon and stool softner). Negative for abdominal pain, diarrhea and nausea.   Genitourinary: Negative for difficulty urinating and dysuria.   Musculoskeletal: Positive for arthralgias and back pain.   Skin: Negative for color change and rash.   Neurological: Negative for dizziness, syncope, weakness and headaches.   Psychiatric/Behavioral: Positive for sleep disturbance (some pain makes stay awake, Melatonin helps some, Naproxen helps sleep some.). The patient is nervous/anxious (did not feel right with Hydroxyzine).        Objective     Vitals:    11/17/17 0833   BP: 144/82   Pulse: 62   Temp: 96.5 °F (35.8 °C)   SpO2: 98%       Results for orders placed or performed in visit on 11/17/17   POC Glycosylated Hemoglobin (Hb A1C)   Result Value Ref Range    Hemoglobin A1C 5.5  %       Physical Exam   Constitutional: He is oriented to person, place, and time. He appears well-developed and well-nourished. No distress.   Neck: No JVD present.   Cardiovascular: Normal rate, regular rhythm, normal heart sounds and intact distal pulses.    No murmur heard.  Pulmonary/Chest: Effort normal and breath sounds normal. No respiratory distress. He exhibits no tenderness.   Abdominal: Soft. He exhibits no distension. There is no tenderness.   Musculoskeletal: He exhibits tenderness (left shoulder, ). He exhibits no edema.   Neurological: He is alert and oriented to person, place, and time.   Skin: Skin is warm and dry. He is not diaphoretic. No erythema. No pallor.   Psychiatric: He has a normal mood and affect. His behavior is normal.   Nursing note and vitals reviewed.      Assessment/Plan   Problem List Items Addressed This Visit        Digestive    Class 1 obesity due to excess calories without serious comorbidity with body mass index (BMI) of 34.0 to 34.9 in adult       Nervous and Auditory    Low back pain - Primary    Chronic left shoulder pain      Other Visit Diagnoses     Elevated glucose        Relevant Orders    POC Glycosylated Hemoglobin (Hb A1C) (Completed)    Need for immunization against influenza        Relevant Orders    Flu Vaccine High Dose PF 65YR+ (Completed)      For pain- Cont to see ortho and take NSAIDs.     Glucose- A1c fine today.     Weight-Discussed need for wt loss. Pt advised to eat a heart healthy diet and get regular aerobic exercise. Set goal of 5 lbs by [place], discussed need for cheat days or meals 1 per month.     Gave flu shot today    Counseling provided on weight management.    Gilberto Comer, ODELL   11/19/2017   3:13 PM

## 2017-11-17 NOTE — PATIENT INSTRUCTIONS
Calorie Counting for Weight Loss  Calories are energy you get from the things you eat and drink. Your body uses this energy to keep you going throughout the day. The number of calories you eat affects your weight. When you eat more calories than your body needs, your body stores the extra calories as fat. When you eat fewer calories than your body needs, your body burns fat to get the energy it needs.  Calorie counting means keeping track of how many calories you eat and drink each day. If you make sure to eat fewer calories than your body needs, you should lose weight. In order for calorie counting to work, you will need to eat the number of calories that are right for you in a day to lose a healthy amount of weight per week. A healthy amount of weight to lose per week is usually 1-2 lb (0.5-0.9 kg). A dietitian can determine how many calories you need in a day and give you suggestions on how to reach your calorie goal.   WHAT IS MY MY PLAN?  My goal is to have __________ calories per day.   If I have this many calories per day, I should lose around __________ pounds per week.  WHAT DO I NEED TO KNOW ABOUT CALORIE COUNTING?  In order to meet your daily calorie goal, you will need to:  · Find out how many calories are in each food you would like to eat. Try to do this before you eat.  · Decide how much of the food you can eat.  · Write down what you ate and how many calories it had. Doing this is called keeping a food log.  WHERE DO I FIND CALORIE INFORMATION?  The number of calories in a food can be found on a Nutrition Facts label. Note that all the information on a label is based on a specific serving of the food. If a food does not have a Nutrition Facts label, try to look up the calories online or ask your dietitian for help.  HOW DO I DECIDE HOW MUCH TO EAT?  To decide how much of the food you can eat, you will need to consider both the number of calories in one serving and the size of one serving. This  information can be found on the Nutrition Facts label. If a food does not have a Nutrition Facts label, look up the information online or ask your dietitian for help.  Remember that calories are listed per serving. If you choose to have more than one serving of a food, you will have to multiply the calories per serving by the amount of servings you plan to eat. For example, the label on a package of bread might say that a serving size is 1 slice and that there are 90 calories in a serving. If you eat 1 slice, you will have eaten 90 calories. If you eat 2 slices, you will have eaten 180 calories.  HOW DO I KEEP A FOOD LOG?  After each meal, record the following information in your food log:  · What you ate.  · How much of it you ate.  · How many calories it had.  · Then, add up your calories.  Keep your food log near you, such as in a small notebook in your pocket. Another option is to use a mobile saroj or website. Some programs will calculate calories for you and show you how many calories you have left each time you add an item to the log.  WHAT ARE SOME CALORIE COUNTING TIPS?  · Use your calories on foods and drinks that will fill you up and not leave you hungry. Some examples of this include foods like nuts and nut butters, vegetables, lean proteins, and high-fiber foods (more than 5 g fiber per serving).  · Eat nutritious foods and avoid empty calories. Empty calories are calories you get from foods or beverages that do not have many nutrients, such as candy and soda. It is better to have a nutritious high-calorie food (such as an avocado) than a food with few nutrients (such as a bag of chips).  · Know how many calories are in the foods you eat most often. This way, you do not have to look up how many calories they have each time you eat them.  · Look out for foods that may seem like low-calorie foods but are really high-calorie foods, such as baked goods, soda, and fat-free candy.  · Pay attention to calories  in drinks. Drinks such as sodas, specialty coffee drinks, alcohol, and juices have a lot of calories yet do not fill you up. Choose low-calorie drinks like water and diet drinks.  · Focus your calorie counting efforts on higher calorie items. Logging the calories in a garden salad that contains only vegetables is less important than calculating the calories in a milk shake.  · Find a way of tracking calories that works for you. Get creative. Most people who are successful find ways to keep track of how much they eat in a day, even if they do not count every calorie.  WHAT ARE SOME PORTION CONTROL TIPS?  · Know how many calories are in a serving. This will help you know how many servings of a certain food you can have.  · Use a measuring cup to measure serving sizes. This is helpful when you start out. With time, you will be able to estimate serving sizes for some foods.  · Take some time to put servings of different foods on your favorite plates, bowls, and cups so you know what a serving looks like.  · Try not to eat straight from a bag or box. Doing this can lead to overeating. Put the amount you would like to eat in a cup or on a plate to make sure you are eating the right portion.  · Use smaller plates, glasses, and bowls to prevent overeating. This is a quick and easy way to practice portion control. If your plate is smaller, less food can fit on it.  · Try not to multitask while eating, such as watching TV or using your computer. If it is time to eat, sit down at a table and enjoy your food. Doing this will help you to start recognizing when you are full. It will also make you more aware of what and how much you are eating.  HOW CAN I CALORIE COUNT WHEN EATING OUT?  · Ask for smaller portion sizes or child-sized portions.  · Consider sharing an entree and sides instead of getting your own entree.  · If you get your own entree, eat only half. Ask for a box at the beginning of your meal and put the rest of your  "entree in it so you are not tempted to eat it.  · Look for the calories on the menu. If calories are listed, choose the lower calorie options.  · Choose dishes that include vegetables, fruits, whole grains, low-fat dairy products, and lean protein. Focusing on smart food choices from each of the 5 food groups can help you stay on track at restaurants.  · Choose items that are boiled, broiled, grilled, or steamed.  · Choose water, milk, unsweetened iced tea, or other drinks without added sugars. If you want an alcoholic beverage, choose a lower calorie option. For example, a regular desmond can have up to 700 calories and a glass of wine has around 150.  · Stay away from items that are buttered, battered, fried, or served with cream sauce. Items labeled \"crispy\" are usually fried, unless stated otherwise.  · Ask for dressings, sauces, and syrups on the side. These are usually very high in calories, so do not eat much of them.  · Watch out for salads. Many people think salads are a healthy option, but this is often not the case. Many salads come with marrero, fried chicken, lots of cheese, fried chips, and dressing. All of these items have a lot of calories. If you want a salad, choose a garden salad and ask for grilled meats or steak. Ask for the dressing on the side, or ask for olive oil and vinegar or lemon to use as dressing.  · Estimate how many servings of a food you are given. For example, a serving of cooked rice is ½ cup or about the size of half a tennis ball or one cupcake wrapper. Knowing serving sizes will help you be aware of how much food you are eating at restaurants. The list below tells you how big or small some common portion sizes are based on everyday objects.    1 oz--4 stacked dice.    3 oz--1 deck of cards.    1 tsp--1 dice.    1 Tbsp--½ a Ping-Pong ball.    2 Tbsp--1 Ping-Pong ball.    ½ cup--1 tennis ball or 1 cupcake wrapper.    1 cup--1 baseball.     This information is not intended to " replace advice given to you by your health care provider. Make sure you discuss any questions you have with your health care provider.     Document Released: 12/18/2006 Document Revised: 01/08/2016 Document Reviewed: 10/23/2014  [employer] Interactive Patient Education ©2017 [employer]

## 2017-11-20 ENCOUNTER — OFFICE VISIT (OUTPATIENT)
Dept: ORTHOPEDIC SURGERY | Facility: CLINIC | Age: 77
End: 2017-11-20

## 2017-11-20 VITALS
WEIGHT: 217 LBS | SYSTOLIC BLOOD PRESSURE: 126 MMHG | BODY MASS INDEX: 34.06 KG/M2 | DIASTOLIC BLOOD PRESSURE: 67 MMHG | HEART RATE: 62 BPM | HEIGHT: 67 IN

## 2017-11-20 DIAGNOSIS — S46.012D RUPTURE OF LEFT SUPRASPINATUS TENDON, SUBSEQUENT ENCOUNTER: Primary | ICD-10-CM

## 2017-11-20 PROCEDURE — 99213 OFFICE O/P EST LOW 20 MIN: CPT | Performed by: ORTHOPAEDIC SURGERY

## 2017-11-20 NOTE — PROGRESS NOTES
[facility]    Subjective     Chief Complaint   Patient presents with   • Left Shoulder - Follow-up     3 week f/u        HPI      Jef Mcarthur is a 77 y.o. male.  He is a worker's comp patient.  He had injury at work September 6, 2017.  He is doing somewhat better but still has weakness of external rotation.  He takes anti-inflammatories and Tylenol.  He goes to physical therapy.  His pain is 4 out 10.  He is unable to lift things with the left arm.  He is left-hand dominant.        Past Medical History:   Diagnosis Date   • Anxiety    • Colon polyps    • Hyperlipidemia    • Hypertension    • Ischemic heart disease    • Tobacco use     Remote tobacco use. Quit in 1978.      Past Surgical History:   Procedure Laterality Date   • CORONARY ANGIOPLASTY WITH STENT PLACEMENT Left 07/16/2010    70% RCA stenosis treated with 3.0 x 18 mm Cordis Cypher IRENA; LVEF 78%.    • TONSILLECTOMY        Family History   Problem Relation Age of Onset   • Osteoarthritis Mother    • Stroke Mother      Social History     Social History   • Marital status:      Spouse name: N/A   • Number of children: N/A   • Years of education: N/A     Occupational History   • Not on file.     Social History Main Topics   • Smoking status: Former Smoker     Packs/day: 0.50     Years: 17.00     Types: Cigarettes     Start date: 1961     Quit date: 6/1/1978   • Smokeless tobacco: Never Used   • Alcohol use Yes      Comment: rare   • Drug use: No   • Sexual activity: Defer     Other Topics Concern   • Not on file     Social History Narrative      Current Outpatient Prescriptions on File Prior to Visit   Medication Sig Dispense Refill   • acetaminophen (TYLENOL) 500 MG tablet Take 1,000 mg by mouth Daily.     • aspirin 81 MG EC tablet Take 81 mg by mouth daily.     • lisinopril-hydrochlorothiazide (PRINZIDE,ZESTORETIC) 20-12.5 MG per tablet Take 1 tablet by mouth Daily. 30 tablet 1   • naproxen (NAPROSYN) 500 MG tablet  "Take 1 tablet by mouth 2 (Two) Times a Day As Needed for Mild Pain . 24 tablet 0   • pantoprazole (PROTONIX) 40 MG EC tablet TAKE ONE TABLET BY MOUTH DAILY 30 tablet 4   • simvastatin (ZOCOR) 20 MG tablet TAKE ONE TABLET BY MOUTH DAILY 30 tablet 0     No current facility-administered medications on file prior to visit.       No Known Allergies     The following portions of the patient's history were reviewed and updated as appropriate: allergies, current medications, past family history, past medical history, past social history, past surgical history and problem list.    Review of Systems   Constitutional: Negative.    HENT: Negative.    Eyes: Negative.    Respiratory: Negative.    Cardiovascular: Negative.    Gastrointestinal: Negative.    Endocrine: Negative.    Genitourinary: Negative.    Musculoskeletal: Positive for arthralgias.   Skin: Negative.    Allergic/Immunologic: Negative.    Neurological: Negative.    Hematological: Negative.    Psychiatric/Behavioral: Negative.         Objective      Physical Exam  /67  Pulse 62  Ht 67\" (170.2 cm)  Wt 217 lb (98.4 kg)  BMI 33.99 kg/m2    Body mass index is 33.99 kg/(m^2).        GENERAL APPEARANCE: awake, alert & oriented x 3, in no acute distress and well developed, well nourished  PSYCH: normal mood andaffect  LUNGS:  breathing nonlabored, no wheezing  EYES: sclera anicteric, pupils equal  CARDIOVASCULAR: palpable pulses dorsalis pedis, palpable posterior tibial bilaterally. Capillary refill less than 2 seconds  INTEGUMENTARY: skin intact, no clubbing, cyanosis  NEUROLOGIC:  Normal gait and balance            Ortho Exam  Musculoskeletal   Upper Extremity   Left Shoulder     Inspection and Palpation:     Tenderness - none     Crepitus - none    Sensation is normal    Examination reveals no ecchymosis.      Strength and Tone:    Supraspinatus - 3/5    Infraspinatus - 5/5    Subscapularis - 5/5    Deltoid - 5/5   Range of Motion   Right shoulder:    Internal " Rotation: ROM - T7    External Rotation: AROM - 80 degrees    Elevation through flexion: AROM - 180 degrees     Abduction - 180   Left Shoulder:    Internal Rotation: ROM - T7    External Rotation: AROM - 80 degrees    Elevation through flexion: AROM - 180 degrees     Abduction - 180 degrees   Instability   Left shoulder    Sulcus sign negative    Apprehension test negative    Meri relocation test negative    Jerk test negative   Impingement   Left shoulder    Vaz impingement test positive    Neer impingement test positive   Functional Testing   Left shoulder    AC crossover adduction test negative    Abdominal compression test negative    Lift-off sign negative    Speed's test negative    Fatima's test negative    Horriblower's sign negative     Imaging/Studies  Imaging Results (last 24 hours)     ** No results found for the last 24 hours. **      I reviewed His MRI shows massive retracted left shoulder rotator cuff tear with humeral head superior migration.    Assessment/Plan      Jef was seen today for follow-up.    Diagnoses and all orders for this visit:    Rupture of left supraspinatus tendon, subsequent encounter  He most likely has a chronic rotator cuff tear that was asymptomatic until his injury at work.  He had pre-existing condition made worse by the following work now is most likely had a has a permanent impairment.  He is not at maximal medical improvement yet.  He needs to continue strengthening and physical therapy.  He will follow up in 1 month.  His work restrictions are no lifting left arm.   eventually he will most likely need a reverse shoulder arthroplasty, but right now he is too asymptomatic to need that surgery    Medical Decision Making  Management Options : over-the-counter medicine and physical/occupational therapy  Data/Risk: independent visualization of imaging, lab tests, or EMG/NCV    Heladio Ochoa MD  11/20/17  9:42 AM

## 2017-11-27 ENCOUNTER — TREATMENT (OUTPATIENT)
Dept: PHYSICAL THERAPY | Facility: CLINIC | Age: 77
End: 2017-11-27

## 2017-11-27 DIAGNOSIS — M25.512 ACUTE PAIN OF LEFT SHOULDER: Primary | ICD-10-CM

## 2017-11-27 PROCEDURE — 97140 MANUAL THERAPY 1/> REGIONS: CPT | Performed by: PHYSICAL THERAPIST

## 2017-11-27 PROCEDURE — 97110 THERAPEUTIC EXERCISES: CPT | Performed by: PHYSICAL THERAPIST

## 2017-11-27 NOTE — PROGRESS NOTES
Physical Therapy Daily Progress Note    TOTAL TIME: 75 MINUTES    Jef Mcarthur reports: PT has really helped; not much pain at rest, just when I use it a lot; weakness complaints      Objective   See Exercise, Manual, and Modality Logs for complete treatment.     THERAPEUTIC EXERCISES/ACTIVITIES ADDED TODAY: n/a    Assessment/Plan  PATIENT NEEDS CONTINUED PHYSICAL THERAPY IN ORDER TO ACHIEVE FULL ROM, STRENGTH AND FUNCTION WITH NO REPORTS OF PAIN IN ORDER TO RTW WITHOUT RESTRICTIONS AND WITHOUT PAIN OR DYSFUNCTION       Progress per Plan of Care           Manual Therapy:    10     mins  35441;  Therapeutic Exercise:    65     mins  58342;     Neuromuscular Rahel:        mins  04349;    Therapeutic Activity:          mins  25405;     Gait Training:           mins  98278;     Ultrasound:          mins  77797;    Electrical Stimulation:         mins  72372 ( );  Dry Needling          mins self-pay    Timed Treatment:   75   mins   Total Treatment:     75   mins    Melvin Rodgers PT  Physical Therapist

## 2017-11-29 ENCOUNTER — TREATMENT (OUTPATIENT)
Dept: PHYSICAL THERAPY | Facility: CLINIC | Age: 77
End: 2017-11-29

## 2017-11-29 DIAGNOSIS — M25.512 ACUTE PAIN OF LEFT SHOULDER: Primary | ICD-10-CM

## 2017-11-29 PROCEDURE — 97140 MANUAL THERAPY 1/> REGIONS: CPT | Performed by: PHYSICAL THERAPIST

## 2017-11-29 PROCEDURE — 97110 THERAPEUTIC EXERCISES: CPT | Performed by: PHYSICAL THERAPIST

## 2017-11-29 NOTE — PROGRESS NOTES
Physical Therapy Daily Progress Note    TOTAL TIME: 75 MINUTES    Jef Mcarthur reports: feeling better overall, still just really weak in certain areas      Objective   See Exercise, Manual, and Modality Logs for complete treatment.     THERAPEUTIC EXERCISES/ACTIVITIES ADDED TODAY: n/a    Manual PROM left shoulder x 10 minutes    Assessment/Plan  PATIENT NEEDS CONTINUED PHYSICAL THERAPY IN ORDER TO ACHIEVE FULL ROM, STRENGTH AND FUNCTION WITH NO REPORTS OF PAIN IN ORDER TO RTW WITHOUT RESTRICTIONS AND WITHOUT PAIN OR DYSFUNCTION       Progress per Plan of Care           Manual Therapy:    10     mins  75079;  Therapeutic Exercise:    65     mins  72427;     Neuromuscular Rahel:        mins  57925;    Therapeutic Activity:          mins  49485;     Gait Training:           mins  68458;     Ultrasound:          mins  18752;    Electrical Stimulation:         mins  64480 ( );  Dry Needling          mins self-pay    Timed Treatment:   75   mins   Total Treatment:     75   mins    Melvin Rodgers PT  Physical Therapist

## 2017-12-04 RX ORDER — LISINOPRIL AND HYDROCHLOROTHIAZIDE 20; 12.5 MG/1; MG/1
TABLET ORAL
Qty: 30 TABLET | Refills: 0 | Status: SHIPPED | OUTPATIENT
Start: 2017-12-04 | End: 2017-12-30 | Stop reason: SDUPTHER

## 2017-12-04 RX ORDER — SIMVASTATIN 20 MG
TABLET ORAL
Qty: 30 TABLET | Refills: 0 | Status: SHIPPED | OUTPATIENT
Start: 2017-12-04 | End: 2017-12-30 | Stop reason: SDUPTHER

## 2017-12-05 ENCOUNTER — TELEPHONE (OUTPATIENT)
Dept: CARDIOLOGY | Facility: CLINIC | Age: 77
End: 2017-12-05

## 2017-12-05 NOTE — TELEPHONE ENCOUNTER
Called to report chest pain when climbing into bed at night and with some lifting and light exertion. No other associated symptoms. Not seen by [facility] in > 1 year. Will schedule appt and if pain persists/changes he will call back and be placed on cancellation list. If pain persists and unrelieved by [facility] he was instructed to come to ER. KH

## 2017-12-06 ENCOUNTER — TREATMENT (OUTPATIENT)
Dept: PHYSICAL THERAPY | Facility: CLINIC | Age: 77
End: 2017-12-06

## 2017-12-06 DIAGNOSIS — M25.512 ACUTE PAIN OF LEFT SHOULDER: Primary | ICD-10-CM

## 2017-12-06 PROCEDURE — 97110 THERAPEUTIC EXERCISES: CPT | Performed by: PHYSICAL THERAPIST

## 2017-12-06 NOTE — PROGRESS NOTES
Physical Therapy Daily Progress Note    TOTAL TIME: 80 MINUTES    Jef Mcarthur reports: shoulder continues to feel better      Objective   See Exercise, Manual, and Modality Logs for complete treatment.     THERAPEUTIC EXERCISES/ACTIVITIES ADDED TODAY: n/a    Assessment/Plan  PATIENT NEEDS CONTINUED PHYSICAL THERAPY IN ORDER TO ACHIEVE FULL ROM, STRENGTH AND FUNCTION WITH NO REPORTS OF PAIN IN ORDER TO RTW WITHOUT RESTRICTIONS AND WITHOUT PAIN OR DYSFUNCTION       Progress per Plan of Care           Manual Therapy:         mins  52736;  Therapeutic Exercise:    80     mins  09432;     Neuromuscular Rahel:        mins  50309;    Therapeutic Activity:          mins  54678;     Gait Training:           mins  07120;     Ultrasound:          mins  36231;    Electrical Stimulation:         mins  70333 ( );  Dry Needling          mins self-pay    Timed Treatment:   80   mins   Total Treatment:     80   mins    Melvin Rodgers PT  Physical Therapist

## 2017-12-11 ENCOUNTER — TREATMENT (OUTPATIENT)
Dept: PHYSICAL THERAPY | Facility: CLINIC | Age: 77
End: 2017-12-11

## 2017-12-11 DIAGNOSIS — M25.512 ACUTE PAIN OF LEFT SHOULDER: Primary | ICD-10-CM

## 2017-12-11 PROCEDURE — 97140 MANUAL THERAPY 1/> REGIONS: CPT | Performed by: PHYSICAL THERAPIST

## 2017-12-11 PROCEDURE — 97110 THERAPEUTIC EXERCISES: CPT | Performed by: PHYSICAL THERAPIST

## 2017-12-11 NOTE — PROGRESS NOTES
Physical Therapy Daily Progress Note    TOTAL TIME: 80 MINUTES    Jef Mcarthur reports: shoulder continues to feel better and stronger      Objective   See Exercise, Manual, and Modality Logs for complete treatment.     THERAPEUTIC EXERCISES/ACTIVITIES ADDED TODAY: blue tband for pull aparts; green tband for shoulder series    Assessment/Plan  PATIENT NEEDS CONTINUED PHYSICAL THERAPY IN ORDER TO ACHIEVE FULL ROM, STRENGTH AND FUNCTION WITH NO REPORTS OF PAIN IN ORDER TO RTW WITHOUT RESTRICTIONS AND WITHOUT PAIN OR DYSFUNCTION     Progress per Plan of Care           Manual Therapy:    10     mins  04950;  Therapeutic Exercise:    70     mins  69002;     Neuromuscular Rahel:        mins  83656;    Therapeutic Activity:          mins  42023;     Gait Training:           mins  90546;     Ultrasound:          mins  63815;    Electrical Stimulation:         mins  95936 ( );  Dry Needling          mins self-pay    Timed Treatment:   80   mins   Total Treatment:     80   mins    Melvin Rodgers PT  Physical Therapist

## 2017-12-13 ENCOUNTER — TREATMENT (OUTPATIENT)
Dept: PHYSICAL THERAPY | Facility: CLINIC | Age: 77
End: 2017-12-13

## 2017-12-13 PROCEDURE — 97110 THERAPEUTIC EXERCISES: CPT | Performed by: PHYSICAL THERAPIST

## 2017-12-13 PROCEDURE — 97140 MANUAL THERAPY 1/> REGIONS: CPT | Performed by: PHYSICAL THERAPIST

## 2017-12-13 NOTE — PROGRESS NOTES
Physical Therapy Daily Progress Note    TOTAL TIME: 75 MINUTES    Jef Mcarthur reports: shoulder feels much stronger, would like to be able to keep doing therapy to improve my functional strength      Objective   See Exercise, Manual, and Modality Logs for complete treatment.     THERAPEUTIC EXERCISES/ACTIVITIES ADDED TODAY: blue and pull aparts    Assessment/Plan  PATIENT NEEDS CONTINUED PHYSICAL THERAPY IN ORDER TO ACHIEVE FULL ROM, STRENGTH AND FUNCTION WITH NO REPORTS OF PAIN IN ORDER TO RTW WITHOUT RESTRICTIONS AND WITHOUT PAIN OR DYSFUNCTION     Progress per Plan of Care           Manual Therapy:    10     mins  92005;  Therapeutic Exercise:    65     mins  31431;     Neuromuscular Rahel:        mins  19206;    Therapeutic Activity:          mins  89840;     Gait Training:           mins  07109;     Ultrasound:          mins  96641;    Electrical Stimulation:         mins  85598 ( );  Dry Needling          mins self-pay    Timed Treatment:   75   mins   Total Treatment:     75   mins    Melvin Rodgers, PT  Physical Therapist

## 2017-12-18 ENCOUNTER — OFFICE VISIT (OUTPATIENT)
Dept: ORTHOPEDIC SURGERY | Facility: CLINIC | Age: 77
End: 2017-12-18

## 2017-12-18 VITALS
HEART RATE: 72 BPM | WEIGHT: 220.46 LBS | DIASTOLIC BLOOD PRESSURE: 78 MMHG | BODY MASS INDEX: 34.6 KG/M2 | SYSTOLIC BLOOD PRESSURE: 162 MMHG | HEIGHT: 67 IN

## 2017-12-18 DIAGNOSIS — S46.012D RUPTURE OF LEFT SUPRASPINATUS TENDON, SUBSEQUENT ENCOUNTER: Primary | ICD-10-CM

## 2017-12-18 PROCEDURE — 99212 OFFICE O/P EST SF 10 MIN: CPT | Performed by: ORTHOPAEDIC SURGERY

## 2017-12-18 NOTE — PROGRESS NOTES
[facility] [place] [facility] [place]    Subjective     Chief Complaint   Patient presents with   • Left Shoulder - Follow-up     1 month        HPI      Jef Mcarthur is a 77 y.o. male.  These well left shoulder massive retracted cuff tear from September 6, 2017.  He is worse with lateral movement and better with rest.  His pain 6 out of 10.  It is stabbing.  He goes to physical therapy at [facility].        Past Medical History:   Diagnosis Date   • Anxiety    • Colon polyps    • Hyperlipidemia    • Hypertension    • Ischemic heart disease    • Tobacco use     Remote tobacco use. Quit in 1978.      Past Surgical History:   Procedure Laterality Date   • CORONARY ANGIOPLASTY WITH STENT PLACEMENT Left 07/16/2010    70% RCA stenosis treated with 3.0 x 18 mm Cordis Cypher IRENA; LVEF 78%.    • TONSILLECTOMY        Family History   Problem Relation Age of Onset   • Osteoarthritis Mother    • Stroke Mother      Social History     Social History   • Marital status:      Spouse name: N/A   • Number of children: N/A   • Years of education: N/A     Occupational History   • Not on file.     Social History Main Topics   • Smoking status: Former Smoker     Packs/day: 0.50     Years: 17.00     Types: Cigarettes     Start date: 1961     Quit date: 6/1/1978   • Smokeless tobacco: Never Used   • Alcohol use Yes      Comment: rare   • Drug use: No   • Sexual activity: Defer     Other Topics Concern   • Not on file     Social History Narrative      Current Outpatient Prescriptions on File Prior to Visit   Medication Sig Dispense Refill   • acetaminophen (TYLENOL) 500 MG tablet Take 1,000 mg by mouth Daily.     • aspirin 81 MG EC tablet Take 81 mg by mouth daily.     • lisinopril-hydrochlorothiazide (PRINZIDE,ZESTORETIC) 20-12.5 MG per tablet TAKE ONE TABLET BY MOUTH DAILY 30 tablet 0   • naproxen (NAPROSYN) 500 MG tablet Take 1 tablet by mouth 2 (Two) Times a Day As Needed for Mild Pain . 24 tablet 0   • pantoprazole  "(PROTONIX) 40 MG EC tablet TAKE ONE TABLET BY MOUTH DAILY 30 tablet 4   • simvastatin (ZOCOR) 20 MG tablet TAKE ONE TABLET BY MOUTH DAILY 30 tablet 0     No current facility-administered medications on file prior to visit.       No Known Allergies     The following portions of the patient's history were reviewed and updated as appropriate: allergies, current medications, past family history, past medical history, past social history, past surgical history and problem list.    Review of Systems     Objective      Physical Exam  /78  Pulse 72  Ht 170.2 cm (67.01\")  Wt 100 kg (220 lb 7.4 oz)  BMI 34.52 kg/m2    Body mass index is 34.52 kg/(m^2).        GENERAL APPEARANCE: awake, alert & oriented x 3, in no acute distress and well developed, well nourished  PSYCH: normal mood andaffect  LUNGS:  breathing nonlabored, no wheezing  EYES: sclera anicteric, pupils equal  CARDIOVASCULAR: palpable pulses dorsalis pedis, palpable posterior tibial bilaterally. Capillary refill less than 2 seconds  INTEGUMENTARY: skin intact, no clubbing, cyanosis  NEUROLOGIC:  Normal gait and balance            Ortho Exam  Musculoskeletal   Upper Extremity   Left Shoulder     Inspection and Palpation:     Tenderness - none     Crepitus - none    Sensation is normal    Examination reveals no ecchymosis.      Strength and Tone:    Supraspinatus - 4/5    Infraspinatus - 5/5    Subscapularis - 5/5    Deltoid - 5/5   Range of Motion   Right shoulder:    Internal Rotation: ROM - T7    External Rotation: AROM - 80 degrees    Elevation through flexion: AROM - 180 degrees     Abduction - 180   Left Shoulder:    Internal Rotation: ROM - T7    External Rotation: AROM - 80 degrees    Elevation through flexion: AROM - 180 degrees     Abduction - 180 degrees   Instability   Left shoulder    Sulcus sign negative    Apprehension test negative    Meri relocation test negative    Jerk test negative   Impingement   Left shoulder    Vaz impingement " test positive    Neer impingement test positive   Functional Testing   Left shoulder    AC crossover adduction test negative    Abdominal compression test negative    Lift-off sign negative    Speed's test negative    Fatima's test negative    Horriblower's sign negative     Imaging/Studies  Imaging Results (last 24 hours)     ** No results found for the last 24 hours. **          Assessment/Plan      Jef was seen today for follow-up.    Diagnoses and all orders for this visit:    Rupture of left supraspinatus tendon, subsequent encounter  -     Ambulatory Referral to Physical Therapy      He has amazingly good motion and strength considering the severity of his rotator cuff tear.  He has little to no pain.  Based upon his age and severity of tear I do not believe he'll get back to full duty.  For now he is on restrictions of no lifting left arm.  He will continue physical therapy and follow-up in 1 month I anticipate MMI at 6 months.  He will most likely need an FCE with permanent restrictions    Medical Decision Making  Management Options : over-the-counter medicine and physical/occupational therapy      Heladio Ochoa MD  12/18/17  9:42 AM

## 2017-12-22 ENCOUNTER — TREATMENT (OUTPATIENT)
Dept: PHYSICAL THERAPY | Facility: CLINIC | Age: 77
End: 2017-12-22

## 2017-12-22 DIAGNOSIS — M25.512 ACUTE PAIN OF LEFT SHOULDER: Primary | ICD-10-CM

## 2017-12-22 PROCEDURE — 97110 THERAPEUTIC EXERCISES: CPT | Performed by: PHYSICAL THERAPIST

## 2017-12-22 NOTE — PROGRESS NOTES
Physical Therapy Daily Progress Note    TOTAL TIME: 80 MINUTES    Jef Mcarthur reports: f/u with Dr. Ochoa in January; shoulder feels good      Objective   See Exercise, Manual, and Modality Logs for complete treatment.     THERAPEUTIC EXERCISES/ACTIVITIES ADDED TODAY: 2# and 4# med ball on wall taps    Assessment/Plan  PATIENT NEEDS CONTINUED PHYSICAL THERAPY IN ORDER TO ACHIEVE FULL ROM, STRENGTH AND FUNCTION WITH NO REPORTS OF PAIN IN ORDER TO RTW WITHOUT RESTRICTIONS AND WITHOUT PAIN OR DYSFUNCTION       Progress per Plan of Care           Manual Therapy:         mins  12765;  Therapeutic Exercise:    80     mins  06062;     Neuromuscular Rahel:        mins  73106;    Therapeutic Activity:          mins  10416;     Gait Training:           mins  09785;     Ultrasound:          mins  75303;    Electrical Stimulation:         mins  97042 ( );  Dry Needling          mins self-pay    Timed Treatment:   80   mins   Total Treatment:     80   mins    Melvin Rodgers, PT  Physical Therapist

## 2017-12-28 ENCOUNTER — TREATMENT (OUTPATIENT)
Dept: PHYSICAL THERAPY | Facility: CLINIC | Age: 77
End: 2017-12-28

## 2017-12-28 DIAGNOSIS — M25.512 ACUTE PAIN OF LEFT SHOULDER: Primary | ICD-10-CM

## 2017-12-28 PROCEDURE — 97110 THERAPEUTIC EXERCISES: CPT | Performed by: PHYSICAL THERAPIST

## 2018-01-02 RX ORDER — SIMVASTATIN 20 MG
TABLET ORAL
Qty: 30 TABLET | Refills: 2 | Status: SHIPPED | OUTPATIENT
Start: 2018-01-02 | End: 2018-02-27 | Stop reason: SDUPTHER

## 2018-01-02 RX ORDER — LISINOPRIL AND HYDROCHLOROTHIAZIDE 20; 12.5 MG/1; MG/1
TABLET ORAL
Qty: 30 TABLET | Refills: 2 | Status: SHIPPED | OUTPATIENT
Start: 2018-01-02 | End: 2018-02-23

## 2018-01-03 ENCOUNTER — TREATMENT (OUTPATIENT)
Dept: PHYSICAL THERAPY | Facility: CLINIC | Age: 78
End: 2018-01-03

## 2018-01-03 DIAGNOSIS — M25.512 ACUTE PAIN OF LEFT SHOULDER: Primary | ICD-10-CM

## 2018-01-03 PROCEDURE — 97110 THERAPEUTIC EXERCISES: CPT | Performed by: PHYSICAL THERAPIST

## 2018-01-03 NOTE — PROGRESS NOTES
Physical Therapy Daily Progress Note    TOTAL TIME: 80 MINUTES    Jef Mcarthur reports: continued shoulder improvement      Objective   See Exercise, Manual, and Modality Logs for complete treatment.     THERAPEUTIC EXERCISES/ACTIVITIES ADDED TODAY: 2# med ball IR/ER wall taps; prone dumb bell shoulder extension/abduction and rows with 3#    Assessment/Plan  PATIENT NEEDS CONTINUED PHYSICAL THERAPY IN ORDER TO ACHIEVE FULL ROM, STRENGTH AND FUNCTION WITH NO REPORTS OF PAIN IN ORDER TO RTW WITHOUT RESTRICTIONS AND WITHOUT PAIN OR DYSFUNCTION       Progress per Plan of Care           Manual Therapy:         mins  05668;  Therapeutic Exercise:    80     mins  57932;     Neuromuscular Rahel:        mins  28071;    Therapeutic Activity:          mins  60335;     Gait Training:           mins  11367;     Ultrasound:          mins  70943;    Electrical Stimulation:         mins  56974 ( );  Dry Needling          mins self-pay    Timed Treatment:   80   mins   Total Treatment:     80   mins    Melvin Rodgers PT  Physical Therapist

## 2018-01-08 ENCOUNTER — TREATMENT (OUTPATIENT)
Dept: PHYSICAL THERAPY | Facility: CLINIC | Age: 78
End: 2018-01-08

## 2018-01-08 DIAGNOSIS — M25.512 ACUTE PAIN OF LEFT SHOULDER: Primary | ICD-10-CM

## 2018-01-08 PROCEDURE — 97110 THERAPEUTIC EXERCISES: CPT | Performed by: PHYSICAL THERAPIST

## 2018-01-09 NOTE — PROGRESS NOTES
Physical Therapy Daily Progress Note    TOTAL TIME: 80 MINUTES    Jef Mcarthur reports: no new complaints; shoulder getting stronger      Objective   See Exercise, Manual, and Modality Logs for complete treatment.     THERAPEUTIC EXERCISES/ACTIVITIES ADDED TODAY: n/a    Assessment/Plan  PATIENT NEEDS CONTINUED PHYSICAL THERAPY IN ORDER TO ACHIEVE FULL ROM, STRENGTH AND FUNCTION WITH NO REPORTS OF PAIN IN ORDER TO RTW WITHOUT RESTRICTIONS AND WITHOUT PAIN OR DYSFUNCTION       Progress per Plan of Care and Progress strengthening /stabilization /functional activity           Manual Therapy:         mins  49732;  Therapeutic Exercise:    80     mins  93736;     Neuromuscular Rahel:        mins  98914;    Therapeutic Activity:          mins  02626;     Gait Training:           mins  94819;     Ultrasound:          mins  96774;    Electrical Stimulation:         mins  28147 ( );  Dry Needling          mins self-pay    Timed Treatment:   80   mins   Total Treatment:     80   mins    Melvin Rodgers PT  Physical Therapist

## 2018-01-10 ENCOUNTER — TREATMENT (OUTPATIENT)
Dept: PHYSICAL THERAPY | Facility: CLINIC | Age: 78
End: 2018-01-10

## 2018-01-10 DIAGNOSIS — M25.512 ACUTE PAIN OF LEFT SHOULDER: Primary | ICD-10-CM

## 2018-01-10 PROCEDURE — 97110 THERAPEUTIC EXERCISES: CPT | Performed by: PHYSICAL THERAPIST

## 2018-01-10 NOTE — PROGRESS NOTES
Physical Therapy Daily Progress Note    TOTAL TIME: 80 MINUTES    Jef Mcarthur reports: no new complaints; continues to feel stronger and better      Objective   See Exercise, Manual, and Modality Logs for complete treatment.     THERAPEUTIC EXERCISES/ACTIVITIES ADDED TODAY: TRX casting and slap shot    Assessment/Plan  PATIENT NEEDS CONTINUED PHYSICAL THERAPY IN ORDER TO ACHIEVE FULL ROM, STRENGTH AND FUNCTION WITH NO REPORTS OF PAIN IN ORDER TO RTW WITHOUT RESTRICTIONS AND WITHOUT PAIN OR DYSFUNCTION       Progress per Plan of Care and Progress strengthening /stabilization /functional activity           Manual Therapy:         mins  14007;  Therapeutic Exercise:    80     mins  06330;     Neuromuscular Rahel:        mins  63208;    Therapeutic Activity:          mins  45667;     Gait Training:           mins  13110;     Ultrasound:          mins  81119;    Electrical Stimulation:         mins  29984 ( );  Dry Needling          mins self-pay    Timed Treatment:   80   mins   Total Treatment:     80   mins    Melvin Rodgers PT  Physical Therapist

## 2018-01-11 ENCOUNTER — OFFICE VISIT (OUTPATIENT)
Dept: CARDIOLOGY | Facility: CLINIC | Age: 78
End: 2018-01-11

## 2018-01-11 VITALS
HEART RATE: 73 BPM | HEIGHT: 69 IN | WEIGHT: 227 LBS | SYSTOLIC BLOOD PRESSURE: 149 MMHG | BODY MASS INDEX: 33.62 KG/M2 | DIASTOLIC BLOOD PRESSURE: 89 MMHG

## 2018-01-11 DIAGNOSIS — E78.2 MIXED HYPERLIPIDEMIA: ICD-10-CM

## 2018-01-11 DIAGNOSIS — I10 ESSENTIAL HYPERTENSION: ICD-10-CM

## 2018-01-11 DIAGNOSIS — I25.10 CORONARY ARTERIOSCLEROSIS IN NATIVE ARTERY: Primary | ICD-10-CM

## 2018-01-11 PROCEDURE — 99212 OFFICE O/P EST SF 10 MIN: CPT | Performed by: INTERNAL MEDICINE

## 2018-01-11 NOTE — PROGRESS NOTES
"  OFFICE FOLLOW UP     Date of Encounter:2018     Name: Jef Mcarthur  : 1940  Address: [address] [place] [address]  Phone: [phone]    PCP: Gilberto Comer, APRN  [address]  [place] [address]    Jef Mcarthur is a 77 y.o. male.      Chief Complaint: Follow up of CAD, HTN    Problem List:   1.  Ischemic heart disease.      a)  Left heart catheterization (abnormal stress test) .          i. PCI to RCA (Cypher IRENA); LVEF 78%.       b)  Dyspnea on exertion in 10/2015.      c)  GXT Cardiolite stress test 2015.          i. Exercise induced ST segment depression (1.5 to 2 mm).              No wall motion abnormalities; LVEF 70%.          Ii.  LHC 4/15/2016: Normal LV, single vessel \"branch\" disease, 60% in D1.  2.  Hypertension.   3.  Hyperlipidemia.   4.  GERD.   5.  Remote tobacco use.        a)  Quit in .    Allergies:  No Known Allergies    Current Medications:   •  acetaminophen 1,000 mg by mouth Daily.  •  aspirin 81 MG EC by mouth daily  •  lisinopril-hydrochlorothiazide 20-12.5 MG BY MOUTH DAILY  •  naproxen 500 MG by mouth 2 (Two) Times a Day As Needed for Mild Pain  •  pantoprazole 40 MG EC BY MOUTH DAILY  •  simvastatin 20 MG BY MOUTH DAILY    History of Present Illness:   Mr. Mcarthur returns for routine follow-up today.  He has had some very atypical \"fleeting\" nonexertional well localized pain to the right upper chest.  Symptoms are similar to those noted prior to his essentially \"normal\" left heart catheterization in 2015.  Medications are as noted above.          The following portions of the patient's history were reviewed and updated as appropriate: allergies, current medications and problem list.    HPI: Pertinent positives as listed in the HPI.  All other systems reviewed and negative.    Objective:    Vitals:    18 1304 18 1307   BP: 130/78 149/89   BP Location: Right arm Right arm   Patient Position: " "Sitting Standing   Pulse: 65 73   Weight: 103 kg (227 lb)    Height: 175.3 cm (69\")        Physical Exam:  GENERAL: Alert, cooperative, in no acute distress.   HEENT: Fundoscopic deferred, otherwise unremarkable.  NECK: No Jugular venous distention, adenopathy, or thyromegaly noted.   HEART: Regular rhythm, normal rate, and no murmurs, gallops, or rubs.   LUNGS: Clear to auscultation bilaterally. No wheezing, rales or ronchi.  ABDOMEN: Flat without evidence of organomegaly, masses, or tenderness.  NEUROLOGIC: No focal abnormalities involving strength or sensation are noted.   EXTREMITIES: No clubbing, cyanosis, or edema noted.     Diagnostic Data:    Lab Results   Component Value Date    HGBA1C 5.5 11/17/2017       Procedures LDL cholesterol was 77 in June, 2017.    Assessment and Plan: The patient is overall doing well.  I note that his blood pressure sitting is at the upper limits of normal.  I encouraged him to take home blood pressure readings and call us prior to his next visit.  Interim follow-up will be with his primary care provider.  He knows that systolic blood pressure should almost always be below 1:30 when taken under appropriate conditions at rest.  I discussed his medical regimen and plans for further care with him in detail.    I, Chadwick Chery MD, [facility], [facility], personally performed the services described in this documentation as scribed by the above named individual in my presence, and it is both accurate and complete. At 3:30 PM on 01/11/2018    Scribed for Chadwick Chery MD by Chrystal Melchor RN. 01/11/2018 1:12 PM.    EMR Dragon/Transcription Disclaimer:  Much of this encounter note is an electronic transcription/translation of spoken language to printed text.  The electronic translation of spoken language may permit erroneous, or at times, nonsensical words or phrases to be inadvertently transcribed.  Although I have reviewed the note for such errors, some may still " exist.

## 2018-01-15 ENCOUNTER — TREATMENT (OUTPATIENT)
Dept: PHYSICAL THERAPY | Facility: CLINIC | Age: 78
End: 2018-01-15

## 2018-01-15 DIAGNOSIS — M25.512 ACUTE PAIN OF LEFT SHOULDER: Primary | ICD-10-CM

## 2018-01-15 PROCEDURE — 97110 THERAPEUTIC EXERCISES: CPT | Performed by: PHYSICAL THERAPIST

## 2018-01-15 NOTE — PROGRESS NOTES
Physical Therapy Daily Progress Note    TOTAL TIME: 80 MINUTES    Jef Mcarthur reports: feels good, no new complaints      Objective   See Exercise, Manual, and Modality Logs for complete treatment.     THERAPEUTIC EXERCISES/ACTIVITIES ADDED TODAY: see flow sheets    Assessment/Plan  PATIENT NEEDS CONTINUED PHYSICAL THERAPY IN ORDER TO ACHIEVE FULL ROM, STRENGTH AND FUNCTION WITH NO REPORTS OF PAIN IN ORDER TO RTW WITHOUT RESTRICTIONS AND WITHOUT PAIN OR DYSFUNCTION       Progress per Plan of Care and Progress strengthening /stabilization /functional activity           Manual Therapy:         mins  84014;  Therapeutic Exercise:    80     mins  89178;     Neuromuscular Rahel:        mins  44055;    Therapeutic Activity:          mins  34834;     Gait Training:           mins  95714;     Ultrasound:          mins  09928;    Electrical Stimulation:         mins  48491 ( );  Dry Needling          mins self-pay    Timed Treatment:   80   mins   Total Treatment:     80   mins    Melvin Rodgers PT  Physical Therapist

## 2018-01-17 ENCOUNTER — OFFICE VISIT (OUTPATIENT)
Dept: ORTHOPEDIC SURGERY | Facility: CLINIC | Age: 78
End: 2018-01-17

## 2018-01-17 VITALS
SYSTOLIC BLOOD PRESSURE: 164 MMHG | BODY MASS INDEX: 33.31 KG/M2 | DIASTOLIC BLOOD PRESSURE: 78 MMHG | HEIGHT: 69 IN | WEIGHT: 224.87 LBS | HEART RATE: 68 BPM

## 2018-01-17 DIAGNOSIS — S46.012D RUPTURE OF LEFT SUPRASPINATUS TENDON, SUBSEQUENT ENCOUNTER: Primary | ICD-10-CM

## 2018-01-17 PROCEDURE — 99212 OFFICE O/P EST SF 10 MIN: CPT | Performed by: ORTHOPAEDIC SURGERY

## 2018-01-17 NOTE — PROGRESS NOTES
[facility] – [place] [facility] Note    Subjective     Chief Complaint   Patient presents with   • Follow-up     1 month left shoulder        HPI      Jef Mcarthur is a 77 y.o. male.  He has a massive left shoulder rotator cuff tear.  Started September 7, 2017.  He is [employer].  It is worse with lateral movement and better with rest.  His pain is 5 out of 10 at times.  He is on work restrictions of no lifting left arm        Past Medical History:   Diagnosis Date   • Anxiety    • Colon polyps    • Hyperlipidemia    • Hypertension    • Ischemic heart disease    • Tobacco use     Remote tobacco use. Quit in 1978.      Past Surgical History:   Procedure Laterality Date   • CORONARY ANGIOPLASTY WITH STENT PLACEMENT Left 07/16/2010    70% RCA stenosis treated with 3.0 x 18 mm Cordis Cypher IRENA; LVEF 78%.    • TONSILLECTOMY        Family History   Problem Relation Age of Onset   • Osteoarthritis Mother    • Stroke Mother      Social History     Social History   • Marital status:      Spouse name: N/A   • Number of children: N/A   • Years of education: N/A     Occupational History   • Not on file.     Social History Main Topics   • Smoking status: Former Smoker     Packs/day: 0.50     Years: 17.00     Types: Cigarettes     Start date: 1961     Quit date: 6/1/1978   • Smokeless tobacco: Never Used   • Alcohol use Yes      Comment: rare   • Drug use: No   • Sexual activity: Defer     Other Topics Concern   • Not on file     Social History Narrative      Current Outpatient Prescriptions on File Prior to Visit   Medication Sig Dispense Refill   • acetaminophen (TYLENOL) 500 MG tablet Take 1,000 mg by mouth Daily.     • aspirin 81 MG EC tablet Take 81 mg by mouth daily.     • lisinopril-hydrochlorothiazide (PRINZIDE,ZESTORETIC) 20-12.5 MG per tablet TAKE ONE TABLET BY MOUTH DAILY 30 tablet 2   • naproxen (NAPROSYN) 500 MG tablet Take 1 tablet by mouth 2 (Two) Times a Day As Needed for Mild Pain . 24 tablet  "0   • pantoprazole (PROTONIX) 40 MG EC tablet TAKE ONE TABLET BY MOUTH DAILY 30 tablet 4   • simvastatin (ZOCOR) 20 MG tablet TAKE ONE TABLET BY MOUTH DAILY 30 tablet 2     No current facility-administered medications on file prior to visit.       No Known Allergies     The following portions of the patient's history were reviewed and updated as appropriate: allergies, current medications, past family history, past medical history, past social history, past surgical history and problem list.    Review of Systems   Constitutional: Negative.    HENT: Negative.    Eyes: Negative.    Respiratory: Negative.    Cardiovascular: Positive for leg swelling.   Gastrointestinal: Negative.    Endocrine: Negative.    Genitourinary: Negative.    Musculoskeletal: Positive for arthralgias (shoulder pain) and back pain.   Skin: Negative.    Allergic/Immunologic: Negative.    Neurological: Negative.    Hematological: Negative.    Psychiatric/Behavioral: Negative.         Objective      Physical Exam  /78  Pulse 68  Ht 175.3 cm (69.02\")  Wt 102 kg (224 lb 13.9 oz)  BMI 33.19 kg/m2    Body mass index is 33.19 kg/(m^2).        GENERAL APPEARANCE: awake, alert & oriented x 3, in no acute distress and well developed, well nourished  PSYCH: normal mood andaffect  LUNGS:  breathing nonlabored, no wheezing  EYES: sclera anicteric, pupils equal  CARDIOVASCULAR: palpable pulses dorsalis pedis, palpable posterior tibial bilaterally. Capillary refill less than 2 seconds  INTEGUMENTARY: skin intact, no clubbing, cyanosis  NEUROLOGIC:  Normal gait and balance            Ortho Exam  Musculoskeletal   Upper Extremity   Left Shoulder     Inspection and Palpation:     Tenderness - none     Crepitus - none    Sensation is normal    Examination reveals no ecchymosis.      Strength and Tone:    Supraspinatus - 5/5    Infraspinatus - 5/5    Subscapularis - 5/5    Deltoid - 5/5   Range of Motion   Right shoulder:    Internal Rotation: ROM - " T7    External Rotation: AROM - 80 degrees    Elevation through flexion: AROM - 180 degrees     Abduction - 180   Left Shoulder:    Internal Rotation: ROM - T7    External Rotation: AROM - 80 degrees    Elevation through flexion: AROM - 180 degrees     Abduction - 180 degrees   Instability   Left shoulder    Sulcus sign negative    Apprehension test negative    Meri relocation test negative    Jerk test negative   Impingement   Left shoulder    Vaz impingement test positive    Neer impingement test positive   Functional Testing   Left shoulder    AC crossover adduction test negative    Abdominal compression test negative    Lift-off sign negative    Speed's test negative    Fatima's test negative    Horriblower's sign negative     Imaging/Studies  Imaging Results (last 24 hours)     ** No results found for the last 24 hours. **      Previous MRI with massive retracted supraspinatus infraspinatus tears    Assessment/Plan      Jef was seen today for follow-up.    Diagnoses and all orders for this visit:    Rupture of left supraspinatus tendon, subsequent encounter  -     Ambulatory Referral to Physical Therapy    He will continue physical therapy.  He is on work restrictions no lifting left arm.  He may need permanent restrictions.  I will see back in 6 weeks.      Medical Decision Making  Management Options : over-the-counter medicine and physical/occupational therapy      Heladio Ochoa MD  01/17/18  9:44 AM

## 2018-01-23 ENCOUNTER — TREATMENT (OUTPATIENT)
Dept: PHYSICAL THERAPY | Facility: CLINIC | Age: 78
End: 2018-01-23

## 2018-01-23 DIAGNOSIS — M25.512 ACUTE PAIN OF LEFT SHOULDER: Primary | ICD-10-CM

## 2018-01-23 PROCEDURE — 97110 THERAPEUTIC EXERCISES: CPT | Performed by: PHYSICAL THERAPIST

## 2018-01-23 NOTE — PROGRESS NOTES
Physical Therapy Daily Progress Note    TOTAL TIME: 80 MINUTES    Jef Mcarthur reports: no new complaints; continues to feel better and better      Objective   See Exercise, Manual, and Modality Logs for complete treatment.     THERAPEUTIC EXERCISES/ACTIVITIES ADDED TODAY: n/a    Assessment/Plan  PATIENT NEEDS CONTINUED PHYSICAL THERAPY IN ORDER TO ACHIEVE FULL ROM, STRENGTH AND FUNCTION WITH NO REPORTS OF PAIN IN ORDER TO RTW WITHOUT RESTRICTIONS AND WITHOUT PAIN OR DYSFUNCTION       Progress per Plan of Care           Manual Therapy:         mins  54971;  Therapeutic Exercise:    80     mins  85717;     Neuromuscular Rahel:        mins  23520;    Therapeutic Activity:          mins  26378;     Gait Training:           mins  05533;     Ultrasound:          mins  88709;    Electrical Stimulation:         mins  55086 ( );  Dry Needling          mins self-pay    Timed Treatment:   80   mins   Total Treatment:     80   mins    Melvin Rodgers PT  Physical Therapist

## 2018-01-25 ENCOUNTER — TREATMENT (OUTPATIENT)
Dept: PHYSICAL THERAPY | Facility: CLINIC | Age: 78
End: 2018-01-25

## 2018-01-25 DIAGNOSIS — M25.512 ACUTE PAIN OF LEFT SHOULDER: Primary | ICD-10-CM

## 2018-01-25 PROCEDURE — 97110 THERAPEUTIC EXERCISES: CPT | Performed by: PHYSICAL THERAPIST

## 2018-01-25 NOTE — PROGRESS NOTES
Physical Therapy Daily Progress Note    TOTAL TIME: 80 MINUTES    Jef Mcarthur reports: shoulder feeling good, getting stronger all the time      Objective   See Exercise, Manual, and Modality Logs for complete treatment.     THERAPEUTIC EXERCISES/ACTIVITIES ADDED TODAY: n/a    Assessment/Plan  PATIENT NEEDS CONTINUED PHYSICAL THERAPY IN ORDER TO ACHIEVE FULL ROM, STRENGTH AND FUNCTION WITH NO REPORTS OF PAIN IN ORDER TO RTW WITHOUT RESTRICTIONS AND WITHOUT PAIN OR DYSFUNCTION       Progress per Plan of Care and Progress strengthening /stabilization /functional activity           Manual Therapy:         mins  05498;  Therapeutic Exercise:    80     mins  61651;     Neuromuscular Rahel:        mins  65805;    Therapeutic Activity:          mins  66480;     Gait Training:           mins  33334;     Ultrasound:          mins  07175;    Electrical Stimulation:         mins  37755 ( );  Dry Needling          mins self-pay    Timed Treatment:   80   mins   Total Treatment:     80   mins    Melvin Rodgers PT  Physical Therapist

## 2018-01-30 ENCOUNTER — TREATMENT (OUTPATIENT)
Dept: PHYSICAL THERAPY | Facility: CLINIC | Age: 78
End: 2018-01-30

## 2018-01-30 DIAGNOSIS — M25.512 ACUTE PAIN OF LEFT SHOULDER: Primary | ICD-10-CM

## 2018-01-30 PROCEDURE — 97110 THERAPEUTIC EXERCISES: CPT | Performed by: PHYSICAL THERAPIST

## 2018-01-30 NOTE — PROGRESS NOTES
Physical Therapy Daily Progress Note    TOTAL TIME: 85 MINUTES    Jef Mcarthur reports: no new complaints; feeling better and stronger      Objective   See Exercise, Manual, and Modality Logs for complete treatment.     THERAPEUTIC EXERCISES/ACTIVITIES ADDED TODAY: n/a    Assessment/Plan  PATIENT NEEDS CONTINUED PHYSICAL THERAPY IN ORDER TO ACHIEVE FULL ROM, STRENGTH AND FUNCTION WITH NO REPORTS OF PAIN IN ORDER TO RTW WITHOUT RESTRICTIONS AND WITHOUT PAIN OR DYSFUNCTION       Progress per Plan of Care and Progress strengthening /stabilization /functional activity           Manual Therapy:    5     mins  20084;  Therapeutic Exercise:    80     mins  05130;     Neuromuscular Rahel:        mins  24630;    Therapeutic Activity:          mins  46700;     Gait Training:           mins  64489;     Ultrasound:          mins  57607;    Electrical Stimulation:         mins  50610 ( );  Dry Needling          mins self-pay    Timed Treatment:   85   mins   Total Treatment:     85   mins    Melvin Rodgers PT  Physical Therapist

## 2018-02-01 ENCOUNTER — TREATMENT (OUTPATIENT)
Dept: PHYSICAL THERAPY | Facility: CLINIC | Age: 78
End: 2018-02-01

## 2018-02-01 DIAGNOSIS — M25.512 ACUTE PAIN OF LEFT SHOULDER: Primary | ICD-10-CM

## 2018-02-01 PROCEDURE — 97110 THERAPEUTIC EXERCISES: CPT | Performed by: PHYSICAL THERAPIST

## 2018-02-01 NOTE — PROGRESS NOTES
Physical Therapy Daily Progress Note    TOTAL TIME: 80 MINUTES    Jef Mcarthur reports: NO NEW COMPLAINTS; FEELING BETTER AND STRONGER      Objective   See Exercise, Manual, and Modality Logs for complete treatment.     THERAPEUTIC EXERCISES/ACTIVITIES ADDED TODAY: N/A    Assessment/Plan  PATIENT NEEDS CONTINUED PHYSICAL THERAPY IN ORDER TO ACHIEVE FULL ROM, STRENGTH AND FUNCTION WITH NO REPORTS OF PAIN IN ORDER TO RTW WITHOUT RESTRICTIONS AND WITHOUT PAIN OR DYSFUNCTION       Progress per Plan of Care and Progress strengthening /stabilization /functional activity           Manual Therapy:         mins  08553;  Therapeutic Exercise:    80     mins  26910;     Neuromuscular Rahel:        mins  13056;    Therapeutic Activity:          mins  91820;     Gait Training:           mins  50292;     Ultrasound:          mins  34205;    Electrical Stimulation:         mins  35179 ( );  Dry Needling          mins self-pay    Timed Treatment:   80   mins   Total Treatment:     80   mins    Melvin Rodgers PT  Physical Therapist

## 2018-02-06 ENCOUNTER — TREATMENT (OUTPATIENT)
Dept: PHYSICAL THERAPY | Facility: CLINIC | Age: 78
End: 2018-02-06

## 2018-02-06 DIAGNOSIS — M25.512 ACUTE PAIN OF LEFT SHOULDER: Primary | ICD-10-CM

## 2018-02-06 PROCEDURE — 97110 THERAPEUTIC EXERCISES: CPT | Performed by: PHYSICAL THERAPIST

## 2018-02-06 NOTE — PROGRESS NOTES
Physical Therapy Daily Progress Note    TOTAL TIME: 90 MINUTES    Jef Mcarthur reports: no new complaints; feeling good       Objective   See Exercise, Manual, and Modality Logs for complete treatment.     THERAPEUTIC EXERCISES/ACTIVITIES ADDED TODAY: see flow sheets    Assessment/Plan  PATIENT NEEDS CONTINUED PHYSICAL THERAPY IN ORDER TO ACHIEVE FULL ROM, STRENGTH AND FUNCTION WITH NO REPORTS OF PAIN IN ORDER TO RTW WITHOUT RESTRICTIONS AND WITHOUT PAIN OR DYSFUNCTION       Progress per Plan of Care and Progress strengthening /stabilization /functional activity           Manual Therapy:         mins  63136;  Therapeutic Exercise:    90     mins  77436;     Neuromuscular Rahel:        mins  35075;    Therapeutic Activity:          mins  02989;     Gait Training:           mins  48259;     Ultrasound:          mins  55993;    Electrical Stimulation:         mins  84515 ( );  Dry Needling          mins self-pay    Timed Treatment:   90   mins   Total Treatment:     90   mins    Melvin Rodgers PT  Physical Therapist

## 2018-02-08 ENCOUNTER — TREATMENT (OUTPATIENT)
Dept: PHYSICAL THERAPY | Facility: CLINIC | Age: 78
End: 2018-02-08

## 2018-02-08 DIAGNOSIS — M25.512 ACUTE PAIN OF LEFT SHOULDER: Primary | ICD-10-CM

## 2018-02-08 PROCEDURE — 97110 THERAPEUTIC EXERCISES: CPT | Performed by: PHYSICAL THERAPIST

## 2018-02-08 NOTE — PROGRESS NOTES
Physical Therapy Daily Progress Note    TOTAL TIME: 80 MINUTES    Jef Mcarthur reports: shoulder feeling good; will f/u with the MD around the 25th of this month      Objective   See Exercise, Manual, and Modality Logs for complete treatment.     THERAPEUTIC EXERCISES/ACTIVITIES ADDED TODAY: wall push ups    Assessment/Plan  PATIENT NEEDS CONTINUED PHYSICAL THERAPY IN ORDER TO ACHIEVE FULL ROM, STRENGTH AND FUNCTION WITH NO REPORTS OF PAIN IN ORDER TO RTW WITHOUT RESTRICTIONS AND WITHOUT PAIN OR DYSFUNCTION       Progress per Plan of Care           Manual Therapy:         mins  49169;  Therapeutic Exercise:    80     mins  12890;     Neuromuscular Rahel:        mins  38237;    Therapeutic Activity:          mins  05585;     Gait Training:           mins  25799;     Ultrasound:          mins  21482;    Electrical Stimulation:         mins  71693 ( );  Dry Needling          mins self-pay    Timed Treatment:   80   mins   Total Treatment:     80   mins    Melvin Rodgers PT  Physical Therapist

## 2018-02-19 ENCOUNTER — TREATMENT (OUTPATIENT)
Dept: PHYSICAL THERAPY | Facility: CLINIC | Age: 78
End: 2018-02-19

## 2018-02-19 DIAGNOSIS — M25.512 ACUTE PAIN OF LEFT SHOULDER: Primary | ICD-10-CM

## 2018-02-19 PROCEDURE — 97110 THERAPEUTIC EXERCISES: CPT | Performed by: PHYSICAL THERAPIST

## 2018-02-19 NOTE — PROGRESS NOTES
Physical Therapy Daily Progress Note    TOTAL TIME: 75 MINUTES    Jef Mcarthur reports: arm feels pretty good; was sick last week      Objective   See Exercise, Manual, and Modality Logs for complete treatment.     THERAPEUTIC EXERCISES/ACTIVITIES ADDED TODAY: n/a    Assessment/Plan  PATIENT NEEDS CONTINUED PHYSICAL THERAPY IN ORDER TO ACHIEVE FULL ROM, STRENGTH AND FUNCTION WITH NO REPORTS OF PAIN IN ORDER TO RTW WITHOUT RESTRICTIONS AND WITHOUT PAIN OR DYSFUNCTION       Progress per Plan of Care and Progress strengthening /stabilization /functional activity           Manual Therapy:         mins  06094;  Therapeutic Exercise:    75     mins  35520;     Neuromuscular Rahel:        mins  36846;    Therapeutic Activity:          mins  97086;     Gait Training:           mins  93205;     Ultrasound:          mins  47772;    Electrical Stimulation:         mins  41791 ( );  Dry Needling          mins self-pay    Timed Treatment:   75   mins   Total Treatment:     75   mins    Melvin Rodgers PT  Physical Therapist

## 2018-02-21 ENCOUNTER — TREATMENT (OUTPATIENT)
Dept: PHYSICAL THERAPY | Facility: CLINIC | Age: 78
End: 2018-02-21

## 2018-02-21 DIAGNOSIS — M25.512 ACUTE PAIN OF LEFT SHOULDER: Primary | ICD-10-CM

## 2018-02-21 PROCEDURE — 97110 THERAPEUTIC EXERCISES: CPT | Performed by: PHYSICAL THERAPIST

## 2018-02-21 NOTE — PROGRESS NOTES
Physical Therapy Daily Progress Note    TOTAL TIME: 65 MINUTES    Jef Mcarthur reports: shoulder is feeling great, really happy with the progress my shoulder has made      Objective   See Exercise, Manual, and Modality Logs for complete treatment.     THERAPEUTIC EXERCISES/ACTIVITIES ADDED TODAY: progressed to 4# today with dumb bell mat exercises    Assessment/Plan  PATIENT NEEDS CONTINUED PHYSICAL THERAPY IN ORDER TO ACHIEVE FULL ROM, STRENGTH AND FUNCTION WITH NO REPORTS OF PAIN IN ORDER TO RTW WITHOUT RESTRICTIONS AND WITHOUT PAIN OR DYSFUNCTION       Progress per Plan of Care and Progress strengthening /stabilization /functional activity           Manual Therapy:         mins  60549;  Therapeutic Exercise:    65     mins  45829;     Neuromuscular Rahel:        mins  04131;    Therapeutic Activity:          mins  91987;     Gait Training:           mins  59365;     Ultrasound:          mins  78651;    Electrical Stimulation:         mins  23008 ( );  Dry Needling          mins self-pay    Timed Treatment:   65   mins   Total Treatment:     65   mins    Melvin Rodgers, PT  Physical Therapist

## 2018-02-23 ENCOUNTER — OFFICE VISIT (OUTPATIENT)
Dept: FAMILY MEDICINE CLINIC | Facility: CLINIC | Age: 78
End: 2018-02-23

## 2018-02-23 VITALS
DIASTOLIC BLOOD PRESSURE: 72 MMHG | WEIGHT: 225 LBS | BODY MASS INDEX: 33.21 KG/M2 | OXYGEN SATURATION: 95 % | HEART RATE: 68 BPM | SYSTOLIC BLOOD PRESSURE: 138 MMHG

## 2018-02-23 DIAGNOSIS — I25.10 CORONARY ARTERIOSCLEROSIS IN NATIVE ARTERY: ICD-10-CM

## 2018-02-23 DIAGNOSIS — I10 ESSENTIAL HYPERTENSION: Primary | ICD-10-CM

## 2018-02-23 PROCEDURE — 99213 OFFICE O/P EST LOW 20 MIN: CPT | Performed by: NURSE PRACTITIONER

## 2018-02-23 RX ORDER — LISINOPRIL 40 MG/1
40 TABLET ORAL DAILY
Qty: 30 TABLET | Refills: 5 | Status: SHIPPED | OUTPATIENT
Start: 2018-02-23 | End: 2018-05-23 | Stop reason: SDUPTHER

## 2018-02-23 RX ORDER — HYDROCHLOROTHIAZIDE 12.5 MG/1
12.5 TABLET ORAL DAILY
Qty: 30 TABLET | Refills: 5 | Status: SHIPPED | OUTPATIENT
Start: 2018-02-23 | End: 2018-08-19 | Stop reason: SDUPTHER

## 2018-02-23 NOTE — PROGRESS NOTES
Chief Complaint   Patient presents with   • Hypertension       Subjective   Jef Mcarthur is a 77 y.o. male    Hypertension   This is a chronic problem. The current episode started more than 1 year ago. The problem has been waxing and waning since onset. The problem is uncontrolled (150-175/ either arm. seems worse in left arm at times.). Associated symptoms include anxiety (Bro in law issues, Bro in law is back in PTs house. Causing much stress). Pertinent negatives include no blurred vision, chest pain, headaches, malaise/fatigue, neck pain, orthopnea, palpitations, peripheral edema, PND, shortness of breath or sweats. There are no associated agents to hypertension. Risk factors for coronary artery disease include dyslipidemia, obesity, male gender and stress. Past treatments include ACE inhibitors and diuretics. The current treatment provides moderate improvement. Hypertensive end-organ damage includes CAD/MI. There is no history of angina, kidney disease, CVA, heart failure, left ventricular hypertrophy, PVD or retinopathy.     Right Sciatic Pain- Has been there a long time- 5 yrs, seems to be getting worse, now all across back. Seems to be effecting ambulation, nicki on concrete, Some unsteady on feet. Not dizzy or lightheaded, more from way he walks. Has taken Phy Tx and does help. Has the exercises at home, helps. Worse if lays on bed, Has not changed mattress in a number of years. Today pretty pain good.     No Known Allergies  Past Medical History:   Diagnosis Date   • Anxiety    • Colon polyps    • Hyperlipidemia    • Hypertension    • Ischemic heart disease    • Tobacco use     Remote tobacco use. Quit in 1978.      Past Surgical History:   Procedure Laterality Date   • CORONARY ANGIOPLASTY WITH STENT PLACEMENT Left 07/16/2010    70% RCA stenosis treated with 3.0 x 18 mm Cordis Cypher IRENA; LVEF 78%.    • TONSILLECTOMY       Social History     Social History   • Marital status:      Spouse name:  N/A   • Number of children: N/A   • Years of education: N/A     Occupational History   • Not on file.     Social History Main Topics   • Smoking status: Former Smoker     Packs/day: 0.50     Years: 17.00     Types: Cigarettes     Start date: 1961     Quit date: 6/1/1978   • Smokeless tobacco: Never Used   • Alcohol use Yes      Comment: rare   • Drug use: No   • Sexual activity: Defer     Other Topics Concern   • Not on file     Social History Narrative        Current Outpatient Prescriptions:   •  acetaminophen (TYLENOL) 500 MG tablet, Take 1,000 mg by mouth Daily., Disp: , Rfl:   •  aspirin 81 MG EC tablet, Take 81 mg by mouth daily., Disp: , Rfl:   •  naproxen (NAPROSYN) 500 MG tablet, Take 1 tablet by mouth 2 (Two) Times a Day As Needed for Mild Pain ., Disp: 24 tablet, Rfl: 0  •  pantoprazole (PROTONIX) 40 MG EC tablet, TAKE ONE TABLET BY MOUTH DAILY, Disp: 30 tablet, Rfl: 4  •  simvastatin (ZOCOR) 20 MG tablet, TAKE ONE TABLET BY MOUTH DAILY, Disp: 30 tablet, Rfl: 2  •  hydrochlorothiazide (HYDRODIURIL) 12.5 MG tablet, Take 1 tablet by mouth Daily., Disp: 30 tablet, Rfl: 5  •  lisinopril (PRINIVIL,ZESTRIL) 40 MG tablet, Take 1 tablet by mouth Daily., Disp: 30 tablet, Rfl: 5     Review of Systems   Constitutional: Negative for chills, fever and malaise/fatigue.   Eyes: Negative for blurred vision.   Respiratory: Negative for cough, shortness of breath and wheezing.    Cardiovascular: Negative for chest pain, palpitations, orthopnea and PND.   Gastrointestinal: Negative for constipation, diarrhea, nausea and vomiting.   Genitourinary: Negative for difficulty urinating and dysuria.   Musculoskeletal: Positive for back pain. Negative for neck pain.   Neurological: Negative for headaches.   Psychiatric/Behavioral: Negative for sleep disturbance. The patient is nervous/anxious.        Objective     Vitals:    02/23/18 1321   BP: 138/72   Pulse:    SpO2:        Results for orders placed or performed in visit on  11/17/17   POC Glycosylated Hemoglobin (Hb A1C)   Result Value Ref Range    Hemoglobin A1C 5.5 %       Physical Exam   Constitutional: He appears well-developed and well-nourished. No distress.   Neck: No JVD present.   Cardiovascular: Normal rate, regular rhythm, normal heart sounds and intact distal pulses.    No murmur heard.  Pulmonary/Chest: Effort normal and breath sounds normal. No respiratory distress. He exhibits no tenderness.   Abdominal: Soft. He exhibits no distension. There is no tenderness.   Musculoskeletal: He exhibits no edema.   Skin: Skin is warm and dry. He is not diaphoretic. No erythema. No pallor.   Psychiatric: He has a normal mood and affect.   Nursing note and vitals reviewed.      Assessment/Plan   Problem List Items Addressed This Visit        Cardiovascular and Mediastinum    Coronary arteriosclerosis in native artery    Relevant Orders    Ambulatory Referral to Cardiology    Essential hypertension - Primary    Relevant Medications    lisinopril (PRINIVIL,ZESTRIL) 40 MG tablet    hydrochlorothiazide (HYDRODIURIL) 12.5 MG tablet    Other Relevant Orders    Ambulatory Referral to Cardiology      Patient to check blood pressure and record daily. Bring to next visit. Patient was encouraged to keep me informed of any acute changes, lack of improvement, or any new concerning symptoms.Plan of care discussed with pt. They verbalized understanding and agreement.     Counseling provided on hypertension.    Gilberto Comer, ODELL   2/23/2018   2:11 PM

## 2018-02-23 NOTE — PATIENT INSTRUCTIONS

## 2018-02-27 ENCOUNTER — TREATMENT (OUTPATIENT)
Dept: PHYSICAL THERAPY | Facility: CLINIC | Age: 78
End: 2018-02-27

## 2018-02-27 DIAGNOSIS — M25.512 ACUTE PAIN OF LEFT SHOULDER: Primary | ICD-10-CM

## 2018-02-27 PROCEDURE — 97110 THERAPEUTIC EXERCISES: CPT | Performed by: PHYSICAL THERAPIST

## 2018-02-27 RX ORDER — SIMVASTATIN 20 MG
20 TABLET ORAL DAILY
Qty: 90 TABLET | Refills: 0 | Status: SHIPPED | OUTPATIENT
Start: 2018-02-27 | End: 2018-07-06 | Stop reason: SDUPTHER

## 2018-02-27 NOTE — PROGRESS NOTES
Physical Therapy Daily Progress Note    Subjective   Jef Mcarthur reports that he feels like he is doing much better. He feels like he has made very good progress given the extent of his injury. He still avoids doing much heavy lifting, nicki overhead.     Objective   See Exercise, Manual, and Modality Logs for complete treatment.     Pt demonstrates AROM nearly WNL in flexion and abduction, moderately limited in ER      Assessment/Plan     Pt limited in his ability to perform exercises that require resisted ER of the left arm; this is consistent with his injury. Pt is able to perform all exercises in the clinic without exacerbation of symptoms.     Progress strengthening /stabilization /functional activity and Awaiting MD orders           Manual Therapy:         mins  69320;  Therapeutic Exercise:    58     mins  41996;     Neuromuscular Rahel:        mins  89496;    Therapeutic Activity:          mins  32942;     Gait Training:           mins  26586;     Ultrasound:          mins  60865;    Electrical Stimulation:         mins  88216 ( );  Dry Needling          mins self-pay    Timed Treatment:   58   mins   Total Treatment:     60   mins    Pete Llamas PT  Physical Therapist

## 2018-02-28 ENCOUNTER — OFFICE VISIT (OUTPATIENT)
Dept: ORTHOPEDIC SURGERY | Facility: CLINIC | Age: 78
End: 2018-02-28

## 2018-02-28 VITALS
WEIGHT: 224.21 LBS | SYSTOLIC BLOOD PRESSURE: 164 MMHG | HEART RATE: 78 BPM | HEIGHT: 69 IN | BODY MASS INDEX: 33.21 KG/M2 | DIASTOLIC BLOOD PRESSURE: 68 MMHG

## 2018-02-28 DIAGNOSIS — S46.012D RUPTURE OF LEFT SUPRASPINATUS TENDON, SUBSEQUENT ENCOUNTER: Primary | ICD-10-CM

## 2018-02-28 PROCEDURE — 99212 OFFICE O/P EST SF 10 MIN: CPT | Performed by: ORTHOPAEDIC SURGERY

## 2018-02-28 NOTE — PROGRESS NOTES
[facility] [place] [facility] Note    Subjective     Chief Complaint   Patient presents with   • Follow-up     6 weeks - Rupture of left supraspinatus tendon        HPI      Jef Mcarthur is a 77 y.o. male.  Patient is follow-up left shoulder massive retracted cuff tear.  Injury occurred September 6, 2017.  He is doing well.  He has no pain.  He goes to physical therapy.  He is off work because they cannot  accommodate no use left arm        Past Medical History:   Diagnosis Date   • Anxiety    • Colon polyps    • Hyperlipidemia    • Hypertension    • Ischemic heart disease    • Tobacco use     Remote tobacco use. Quit in 1978.      Past Surgical History:   Procedure Laterality Date   • CORONARY ANGIOPLASTY WITH STENT PLACEMENT Left 07/16/2010    70% RCA stenosis treated with 3.0 x 18 mm Cordis Cypher IRENA; LVEF 78%.    • TONSILLECTOMY        Family History   Problem Relation Age of Onset   • Osteoarthritis Mother    • Stroke Mother      Social History     Social History   • Marital status:      Spouse name: N/A   • Number of children: N/A   • Years of education: N/A     Occupational History   • Not on file.     Social History Main Topics   • Smoking status: Former Smoker     Packs/day: 0.50     Years: 17.00     Types: Cigarettes     Start date: 1961     Quit date: 6/1/1978   • Smokeless tobacco: Never Used   • Alcohol use Yes      Comment: rare   • Drug use: No   • Sexual activity: Defer     Other Topics Concern   • Not on file     Social History Narrative      Current Outpatient Prescriptions on File Prior to Visit   Medication Sig Dispense Refill   • acetaminophen (TYLENOL) 500 MG tablet Take 1,000 mg by mouth Daily.     • aspirin 81 MG EC tablet Take 81 mg by mouth daily.     • hydrochlorothiazide (HYDRODIURIL) 12.5 MG tablet Take 1 tablet by mouth Daily. 30 tablet 5   • lisinopril (PRINIVIL,ZESTRIL) 40 MG tablet Take 1 tablet by mouth Daily. 30 tablet 5   • pantoprazole (PROTONIX) 40 MG EC  "tablet TAKE ONE TABLET BY MOUTH DAILY 30 tablet 4   • simvastatin (ZOCOR) 20 MG tablet Take 1 tablet by mouth Daily. 90 tablet 0   • [DISCONTINUED] naproxen (NAPROSYN) 500 MG tablet Take 1 tablet by mouth 2 (Two) Times a Day As Needed for Mild Pain . 24 tablet 0     No current facility-administered medications on file prior to visit.       No Known Allergies     The following portions of the patient's history were reviewed and updated as appropriate: allergies, current medications, past family history, past medical history, past social history, past surgical history and problem list.    Review of Systems   Constitutional: Negative.    HENT: Negative.    Eyes: Negative.    Respiratory: Negative.    Cardiovascular: Negative.    Gastrointestinal: Positive for constipation.   Endocrine: Negative.    Genitourinary: Negative.    Musculoskeletal: Positive for arthralgias and back pain.   Skin: Negative.    Allergic/Immunologic: Negative.    Neurological: Negative.    Hematological: Negative.    Psychiatric/Behavioral: Negative.         Objective      Physical Exam  /68  Pulse 78  Ht 175.3 cm (69.02\")  Wt 102 kg (224 lb 3.3 oz)  BMI 33.09 kg/m2    Body mass index is 33.09 kg/(m^2).        GENERAL APPEARANCE: awake, alert & oriented x 3, in no acute distress and well developed, well nourished  PSYCH: normal mood and affect    Ortho Exam  Musculoskeletal   Upper Extremity   Left Shoulder     Inspection and Palpation:     Tenderness - none     Crepitus - none    Sensation is normal    Examination reveals no ecchymosis.      Strength and Tone:    Supraspinatus, Infraspinatus - 5/5    Subscapularis - 5/5    Deltoid - 5/5   Range of Motion   Right shoulder:    Internal Rotation: ROM - T7    External Rotation: AROM - 80 degrees    Elevation through flexion: AROM - 180 degrees     Abduction - 180   Left Shoulder:    Internal Rotation: ROM - T7    External Rotation: AROM - 80 degrees    Elevation through flexion: AROM - " 180 degrees     Abduction - 180 degrees   Instability   Left shoulder    Sulcus sign negative    Apprehension test negative    Meri relocation test negative    Jerk test negative   Impingement   Left shoulder    Vaz impingement test positive    Neer impingement test positive   Functional Testing   Left shoulder    AC crossover adduction test negative    Abdominal compression test negative    Lift-off sign negative    Speed's test negative    Fatima's test negative    Horriblower's sign negative     Imaging/Studies  Imaging Results (last 7 days)     ** No results found for the last 168 hours. **      Previous MRI with massive retracted rotator cuff tear    Assessment/Plan      Jef was seen today for follow-up.    Diagnoses and all orders for this visit:    Rupture of left supraspinatus tendon, subsequent encounter  -     Ambulatory Referral to Physical Therapy  He will continue physical therapy.  He is work restrictions on his left arm.  He will follow-up in one month.  I anticipate MMI at that time with permanent restrictions.  Most likely without restriction no lifting left arm no shoulder level or above work   Medical Decision Making  Management Options : over-the-counter medicine and physical/occupational therapy    Heladio Ochoa MD  02/28/18  8:49 AM

## 2018-03-01 ENCOUNTER — TREATMENT (OUTPATIENT)
Dept: PHYSICAL THERAPY | Facility: CLINIC | Age: 78
End: 2018-03-01

## 2018-03-01 DIAGNOSIS — M25.512 ACUTE PAIN OF LEFT SHOULDER: Primary | ICD-10-CM

## 2018-03-01 PROCEDURE — 97110 THERAPEUTIC EXERCISES: CPT | Performed by: PHYSICAL THERAPIST

## 2018-03-01 NOTE — PROGRESS NOTES
Physical Therapy Daily Progress Note    Subjective   Jef Mcarthur reports that he went to his MD and he would like him to do 4 more weeks of therapy.    Objective   See Exercise, Manual, and Modality Logs for complete treatment.       Assessment/Plan     Pt is able to perform all exercises without exacerbation of symptoms. Will continue to progress with strengthening and improving ability to perform functional actvities.     Progress per Plan of Care and Progress strengthening /stabilization /functional activity           Manual Therapy:         mins  61706;  Therapeutic Exercise:    64     mins  72548;     Neuromuscular Rahel:        mins  27094;    Therapeutic Activity:          mins  73878;     Gait Training:           mins  30584;     Ultrasound:          mins  89249;    Electrical Stimulation:         mins  64807 ( );  Dry Needling          mins self-pay    Timed Treatment:   64   mins   Total Treatment:     65   mins    Pete Llamas, PT  Physical Therapist

## 2018-03-02 ENCOUNTER — OFFICE VISIT (OUTPATIENT)
Dept: INTERNAL MEDICINE | Facility: CLINIC | Age: 78
End: 2018-03-02

## 2018-03-02 VITALS
WEIGHT: 224.6 LBS | BODY MASS INDEX: 33.27 KG/M2 | TEMPERATURE: 98 F | HEART RATE: 70 BPM | SYSTOLIC BLOOD PRESSURE: 124 MMHG | RESPIRATION RATE: 16 BRPM | DIASTOLIC BLOOD PRESSURE: 58 MMHG | HEIGHT: 69 IN

## 2018-03-02 DIAGNOSIS — E78.2 MIXED HYPERLIPIDEMIA: ICD-10-CM

## 2018-03-02 DIAGNOSIS — I10 ESSENTIAL HYPERTENSION: ICD-10-CM

## 2018-03-02 DIAGNOSIS — N47.1 PHIMOSIS: ICD-10-CM

## 2018-03-02 DIAGNOSIS — I25.10 CORONARY ARTERIOSCLEROSIS IN NATIVE ARTERY: Primary | ICD-10-CM

## 2018-03-02 PROCEDURE — 99214 OFFICE O/P EST MOD 30 MIN: CPT | Performed by: INTERNAL MEDICINE

## 2018-03-02 NOTE — PROGRESS NOTES
Subjective   Jef Mcarthur is a 77 y.o. male.     History of Present Illness   For follow up of  CAD no significant chest pain or sob.  Just saw his cardiologist and all was fine.  HTN he has increased his lisinopril recently.  No headaches.  GERD is stable on meds.  He is seeing workman's comp for a left shoulder injury.  His back pain comes and goes.  Hyperlipidemia on meds, no muscle aches.      The following portions of the patient's history were reviewed and updated as appropriate: allergies, current medications, past medical history and problem list.    Review of Systems   Constitutional: Negative.    Eyes: Negative.    Respiratory: Negative.  Negative for cough, chest tightness, shortness of breath and wheezing.    Cardiovascular: Negative.  Negative for chest pain, palpitations and leg swelling.   Gastrointestinal: Negative.  Negative for abdominal distention and abdominal pain.   Genitourinary: Positive for penile pain.        Is having trouble with foreskin, is cracking and cannot pull back.       Objective   Physical Exam   Constitutional: He appears well-developed and well-nourished.   Neck: Normal range of motion. Neck supple.   Cardiovascular: Normal rate, regular rhythm and normal heart sounds.  Exam reveals no gallop and no friction rub.    No murmur heard.  Pulmonary/Chest: Effort normal and breath sounds normal. No respiratory distress. He has no wheezes. He has no rales. He exhibits no tenderness.   Abdominal: Soft. He exhibits no distension. There is no tenderness.   Nursing note and vitals reviewed.      Assessment/Plan   Jef was seen today for hypertension.    Diagnoses and all orders for this visit:    Coronary arteriosclerosis in native artery    Mixed hyperlipidemia    Essential hypertension    Phimosis  -     Ambulatory Referral to Urology    All problems are stable.  Same meds.  Recheck 4 months.  Will refer to urology.

## 2018-03-07 ENCOUNTER — TREATMENT (OUTPATIENT)
Dept: PHYSICAL THERAPY | Facility: CLINIC | Age: 78
End: 2018-03-07

## 2018-03-07 DIAGNOSIS — M25.512 ACUTE PAIN OF LEFT SHOULDER: Primary | ICD-10-CM

## 2018-03-07 PROCEDURE — 97110 THERAPEUTIC EXERCISES: CPT | Performed by: PHYSICAL THERAPIST

## 2018-03-07 NOTE — PROGRESS NOTES
Physical Therapy Daily Progress Note    TOTAL TIME: 80 MINUTES    Jef Mcarthur reports: shoulder still feeling good, getting stronger each week      Objective   See Exercise, Manual, and Modality Logs for complete treatment.     THERAPEUTIC EXERCISES/ACTIVITIES ADDED TODAY: see flow sheets    Assessment/Plan  PATIENT NEEDS CONTINUED PHYSICAL THERAPY IN ORDER TO ACHIEVE FULL ROM, STRENGTH AND FUNCTION WITH NO REPORTS OF PAIN IN ORDER TO RTW WITHOUT RESTRICTIONS AND WITHOUT PAIN OR DYSFUNCTION       Progress per Plan of Care           Manual Therapy:         mins  15308;  Therapeutic Exercise:    80     mins  56967;     Neuromuscular Rahel:        mins  73978;    Therapeutic Activity:          mins  50997;     Gait Training:           mins  86064;     Ultrasound:          mins  12829;    Electrical Stimulation:         mins  18012 ( );  Dry Needling          mins self-pay    Timed Treatment:   80   mins   Total Treatment:     80   mins    Melvin Rodgers PT  Physical Therapist

## 2018-03-12 ENCOUNTER — TRANSCRIBE ORDERS (OUTPATIENT)
Dept: CARDIOLOGY | Facility: CLINIC | Age: 78
End: 2018-03-12

## 2018-03-12 DIAGNOSIS — I71.40 AAA (ABDOMINAL AORTIC ANEURYSM) WITHOUT RUPTURE (HCC): Primary | ICD-10-CM

## 2018-03-14 ENCOUNTER — TREATMENT (OUTPATIENT)
Dept: PHYSICAL THERAPY | Facility: CLINIC | Age: 78
End: 2018-03-14

## 2018-03-14 DIAGNOSIS — M25.512 ACUTE PAIN OF LEFT SHOULDER: Primary | ICD-10-CM

## 2018-03-14 PROCEDURE — 97110 THERAPEUTIC EXERCISES: CPT | Performed by: PHYSICAL THERAPIST

## 2018-03-14 NOTE — PROGRESS NOTES
Physical Therapy Daily Progress Note    TOTAL TIME: 80 MINUTES    Jef Mcarthur reports: shoulder feeling really good      Objective   See Exercise, Manual, and Modality Logs for complete treatment.     THERAPEUTIC EXERCISES/ACTIVITIES ADDED TODAY: see flow sheets    Assessment/Plan  PATIENT NEEDS CONTINUED PHYSICAL THERAPY IN ORDER TO ACHIEVE FULL ROM, STRENGTH AND FUNCTION WITH NO REPORTS OF PAIN IN ORDER TO RTW WITHOUT RESTRICTIONS AND WITHOUT PAIN OR DYSFUNCTION       Progress per Plan of Care           Manual Therapy:         mins  04626;  Therapeutic Exercise:    80     mins  11846;     Neuromuscular Rahel:        mins  07525;    Therapeutic Activity:          mins  71114;     Gait Training:           mins  95351;     Ultrasound:          mins  06876;    Electrical Stimulation:         mins  76663 ( );  Dry Needling          mins self-pay    Timed Treatment:   80   mins   Total Treatment:     80   mins    Melvin Rodgers, PT  Physical Therapist

## 2018-03-19 ENCOUNTER — APPOINTMENT (OUTPATIENT)
Dept: ULTRASOUND IMAGING | Facility: HOSPITAL | Age: 78
End: 2018-03-19

## 2018-03-21 ENCOUNTER — TREATMENT (OUTPATIENT)
Dept: PHYSICAL THERAPY | Facility: CLINIC | Age: 78
End: 2018-03-21

## 2018-03-21 DIAGNOSIS — M25.512 ACUTE PAIN OF LEFT SHOULDER: Primary | ICD-10-CM

## 2018-03-21 PROCEDURE — 97110 THERAPEUTIC EXERCISES: CPT | Performed by: PHYSICAL THERAPIST

## 2018-03-21 NOTE — PROGRESS NOTES
Physical Therapy Daily Progress Note    TOTAL TIME: 80 MINUTES    Jef Mcarthur reports: shoulder feels much better, normal function with ADL's; patient is doubtful that he can RTW because of heavy lifting and overhead lifting      Objective   See Exercise, Manual, and Modality Logs for complete treatment.     THERAPEUTIC EXERCISES/ACTIVITIES ADDED TODAY: see flow sheets    Assessment/Plan  Patient has been very diligent with therapy and has made excellent progress especially considering the extent of the RTC damage; will await MD recommendations     Awaiting MD orders           Manual Therapy:         mins  67993;  Therapeutic Exercise:    80     mins  31709;     Neuromuscular Rahel:        mins  07070;    Therapeutic Activity:          mins  68968;     Gait Training:           mins  79481;     Ultrasound:          mins  97873;    Electrical Stimulation:         mins  74983 ( );  Dry Needling          mins self-pay    Timed Treatment:   80   mins   Total Treatment:     80   mins    Melvin Rodgers, PT  Physical Therapist

## 2018-03-28 ENCOUNTER — OFFICE VISIT (OUTPATIENT)
Dept: ORTHOPEDIC SURGERY | Facility: CLINIC | Age: 78
End: 2018-03-28

## 2018-03-28 VITALS
DIASTOLIC BLOOD PRESSURE: 78 MMHG | HEIGHT: 69 IN | SYSTOLIC BLOOD PRESSURE: 169 MMHG | HEART RATE: 71 BPM | BODY MASS INDEX: 32.82 KG/M2 | WEIGHT: 221.56 LBS

## 2018-03-28 DIAGNOSIS — S46.012D RUPTURE OF LEFT SUPRASPINATUS TENDON, SUBSEQUENT ENCOUNTER: Primary | ICD-10-CM

## 2018-03-28 PROCEDURE — 99212 OFFICE O/P EST SF 10 MIN: CPT | Performed by: ORTHOPAEDIC SURGERY

## 2018-03-28 NOTE — PROGRESS NOTES
[facility] [place] [facility]    Subjective     Chief Complaint   Patient presents with   • Left Shoulder - Follow-up     1 month        HPI      Jef Mcarthur is a 77 y.o. male. He is follow-up left shoulder rotator cuff injury.  He is doing better.  His pain is 4 out of 10 and stabbing at times but he does not feel it hurts enough to have surgery.        Past Medical History:   Diagnosis Date   • Anxiety    • Colon polyps    • Hyperlipidemia    • Hypertension    • Ischemic heart disease    • Tobacco use     Remote tobacco use. Quit in 1978.      Past Surgical History:   Procedure Laterality Date   • CORONARY ANGIOPLASTY WITH STENT PLACEMENT Left 07/16/2010    70% RCA stenosis treated with 3.0 x 18 mm Cordis Cypher IRENA; LVEF 78%.    • TONSILLECTOMY        Family History   Problem Relation Age of Onset   • Osteoarthritis Mother    • Stroke Mother      Social History     Social History   • Marital status:      Spouse name: N/A   • Number of children: N/A   • Years of education: N/A     Occupational History   • Not on file.     Social History Main Topics   • Smoking status: Former Smoker     Packs/day: 0.50     Years: 17.00     Types: Cigarettes     Start date: 1961     Quit date: 6/1/1978   • Smokeless tobacco: Never Used   • Alcohol use Yes      Comment: rare   • Drug use: No   • Sexual activity: Defer     Other Topics Concern   • Not on file     Social History Narrative   • No narrative on file      Current Outpatient Prescriptions on File Prior to Visit   Medication Sig Dispense Refill   • acetaminophen (TYLENOL) 500 MG tablet Take 1,000 mg by mouth Daily.     • aspirin 81 MG EC tablet Take 81 mg by mouth daily.     • hydrochlorothiazide (HYDRODIURIL) 12.5 MG tablet Take 1 tablet by mouth Daily. 30 tablet 5   • lisinopril (PRINIVIL,ZESTRIL) 40 MG tablet Take 1 tablet by mouth Daily. 30 tablet 5   • pantoprazole (PROTONIX) 40 MG EC tablet TAKE ONE TABLET BY MOUTH DAILY 30 tablet 4   •  "simvastatin (ZOCOR) 20 MG tablet Take 1 tablet by mouth Daily. 90 tablet 0     No current facility-administered medications on file prior to visit.       No Known Allergies     The following portions of the patient's history were reviewed and updated as appropriate: allergies, current medications, past family history, past medical history, past social history, past surgical history and problem list.    Review of Systems   Constitutional: Negative.    HENT: Negative.    Eyes: Negative.    Respiratory: Positive for shortness of breath.    Cardiovascular: Positive for leg swelling.   Gastrointestinal: Negative.    Endocrine: Negative.    Genitourinary: Negative.    Musculoskeletal: Positive for back pain.   Skin: Negative.    Allergic/Immunologic: Negative.    Neurological: Negative.    Hematological: Negative.    Psychiatric/Behavioral: Negative.         Objective      Physical Exam  /78   Pulse 71   Ht 175.3 cm (69.02\")   Wt 101 kg (221 lb 9 oz)   BMI 32.70 kg/m²     Body mass index is 32.7 kg/m².        GENERAL APPEARANCE: awake, alert & oriented x 3, in no acute distress and well developed, well nourished  PSYCH: normal mood and affect      Ortho Exam  Musculoskeletal   Upper Extremity   Left Shoulder     Inspection and Palpation:     Tenderness - none     Crepitus - none    Sensation is normal    Examination reveals no ecchymosis.      Strength and Tone:    Supraspinatus, Infraspinatus - 4/5    Subscapularis - 5/5    Deltoid - 5/5   Range of Motion   Right shoulder:    Internal Rotation: ROM - T7    External Rotation: AROM - 80 degrees    Elevation through flexion: AROM - 180 degrees     Abduction - 180   Left Shoulder:    Internal Rotation: ROM - T7    External Rotation: AROM - 80 degrees    Elevation through flexion: AROM - 180 degrees     Abduction - 180 degrees   Instability   Left shoulder    Sulcus sign negative    Apprehension test negative    Meri relocation test negative    Jerk test " negative   Impingement   Left shoulder    Vaz impingement test positive    Neer impingement test positive   Functional Testing   Left shoulder    AC crossover adduction test negative    Abdominal compression test negative    Lift-off sign negative    Speed's test negative    Fatima's test negative    Horriblower's sign negative       Assessment/Plan        ICD-10-CM ICD-9-CM   1. Rupture of left supraspinatus tendon, subsequent encounter S46.012D V58.89     840.6     He is follow-up left shoulder massive retracted rotator cuff tear from a work injury.  He needs permanent restrictions of no shoulder level or above work on the left and no lifting left arm.  He does not need surgery at this time because he is not having pain and he has full motion.  He does have weakness 4 out of 5 of his supraspinatus and infraspinatus.  This weakness would give him a permanent impairment rating.  I will see him back as needed.  He will do a home exercise program.    Medical Decision Making  Management Options : over-the-counter medicine      Heladio Ochoa MD  03/28/18  8:54 AM

## 2018-04-02 RX ORDER — LISINOPRIL AND HYDROCHLOROTHIAZIDE 20; 12.5 MG/1; MG/1
TABLET ORAL
Qty: 30 TABLET | Refills: 1 | OUTPATIENT
Start: 2018-04-02

## 2018-04-02 RX ORDER — SIMVASTATIN 20 MG
TABLET ORAL
Qty: 30 TABLET | Refills: 1 | Status: SHIPPED | OUTPATIENT
Start: 2018-04-02 | End: 2018-08-24 | Stop reason: SDUPTHER

## 2018-04-11 ENCOUNTER — TELEPHONE (OUTPATIENT)
Dept: INTERNAL MEDICINE | Facility: CLINIC | Age: 78
End: 2018-04-11

## 2018-04-11 NOTE — TELEPHONE ENCOUNTER
----- Message from Raina Rose sent at 4/11/2018  3:19 PM EDT -----  Patient called states he is having a problem with one of his medications.  States when he went to  his pantoprazole (PROTONIX) 40 MG EC tablet he was told he had reached maximum days of this??  States he would have to pay out of pocket if he picks it up.  He is not sure what is needed and wants to know if we will reach out to his insurance.  Patient uses [facility] on [address] and can be reached at [phone].  His drug program [facility].

## 2018-04-12 RX ORDER — OMEPRAZOLE 20 MG/1
20 CAPSULE, DELAYED RELEASE ORAL DAILY
Qty: 30 CAPSULE | Refills: 11 | Status: SHIPPED | OUTPATIENT
Start: 2018-04-12 | End: 2021-02-18 | Stop reason: SDUPTHER

## 2018-05-23 ENCOUNTER — TELEPHONE (OUTPATIENT)
Dept: FAMILY MEDICINE CLINIC | Facility: CLINIC | Age: 78
End: 2018-05-23

## 2018-05-23 DIAGNOSIS — I10 ESSENTIAL HYPERTENSION: ICD-10-CM

## 2018-05-23 RX ORDER — LISINOPRIL 40 MG/1
40 TABLET ORAL DAILY
Qty: 90 TABLET | Refills: 1 | Status: SHIPPED | OUTPATIENT
Start: 2018-05-23 | End: 2018-07-06 | Stop reason: ALTCHOICE

## 2018-05-23 NOTE — TELEPHONE ENCOUNTER
Lov:02/23/2018  Ucv: 06/18/2018    Patient is requesting new 90 day script to replace 30 day script.      E-rx to [address]

## 2018-07-06 ENCOUNTER — OFFICE VISIT (OUTPATIENT)
Dept: INTERNAL MEDICINE | Facility: CLINIC | Age: 78
End: 2018-07-06

## 2018-07-06 VITALS
HEART RATE: 70 BPM | BODY MASS INDEX: 32.92 KG/M2 | WEIGHT: 223 LBS | DIASTOLIC BLOOD PRESSURE: 84 MMHG | RESPIRATION RATE: 19 BRPM | SYSTOLIC BLOOD PRESSURE: 142 MMHG

## 2018-07-06 DIAGNOSIS — E78.2 MIXED HYPERLIPIDEMIA: ICD-10-CM

## 2018-07-06 DIAGNOSIS — Z00.00 HEALTH MAINTENANCE EXAMINATION: ICD-10-CM

## 2018-07-06 DIAGNOSIS — M54.41 CHRONIC MIDLINE LOW BACK PAIN WITH RIGHT-SIDED SCIATICA: ICD-10-CM

## 2018-07-06 DIAGNOSIS — I25.9 ISCHEMIC HEART DISEASE: ICD-10-CM

## 2018-07-06 DIAGNOSIS — R73.9 ELEVATED BLOOD SUGAR: ICD-10-CM

## 2018-07-06 DIAGNOSIS — G89.29 CHRONIC MIDLINE LOW BACK PAIN WITH RIGHT-SIDED SCIATICA: ICD-10-CM

## 2018-07-06 DIAGNOSIS — I25.10 CORONARY ARTERIOSCLEROSIS IN NATIVE ARTERY: Primary | ICD-10-CM

## 2018-07-06 DIAGNOSIS — I10 ESSENTIAL HYPERTENSION: ICD-10-CM

## 2018-07-06 LAB
ALBUMIN SERPL-MCNC: 4.73 G/DL (ref 3.2–4.8)
ALBUMIN/GLOB SERPL: 2.1 G/DL (ref 1.5–2.5)
ALP SERPL-CCNC: 51 U/L (ref 25–100)
ALT SERPL W P-5'-P-CCNC: 30 U/L (ref 7–40)
ANION GAP SERPL CALCULATED.3IONS-SCNC: 8 MMOL/L (ref 3–11)
ARTICHOKE IGE QN: 89 MG/DL (ref 0–130)
AST SERPL-CCNC: 23 U/L (ref 0–33)
BILIRUB SERPL-MCNC: 0.5 MG/DL (ref 0.3–1.2)
BUN BLD-MCNC: 19 MG/DL (ref 9–23)
BUN/CREAT SERPL: 16.2 (ref 7–25)
CALCIUM SPEC-SCNC: 9.3 MG/DL (ref 8.7–10.4)
CHLORIDE SERPL-SCNC: 105 MMOL/L (ref 99–109)
CHOLEST SERPL-MCNC: 140 MG/DL (ref 0–200)
CO2 SERPL-SCNC: 25 MMOL/L (ref 20–31)
CREAT BLD-MCNC: 1.17 MG/DL (ref 0.6–1.3)
DEPRECATED RDW RBC AUTO: 42.7 FL (ref 37–54)
ERYTHROCYTE [DISTWIDTH] IN BLOOD BY AUTOMATED COUNT: 13.1 % (ref 11.3–14.5)
GFR SERPL CREATININE-BSD FRML MDRD: 60 ML/MIN/1.73
GLOBULIN UR ELPH-MCNC: 2.3 GM/DL
GLUCOSE BLD-MCNC: 113 MG/DL (ref 70–100)
HBA1C MFR BLD: 6.2 % (ref 4.8–5.6)
HCT VFR BLD AUTO: 42.1 % (ref 38.9–50.9)
HDLC SERPL-MCNC: 40 MG/DL (ref 40–60)
HGB BLD-MCNC: 14.1 G/DL (ref 13.1–17.5)
MCH RBC QN AUTO: 30 PG (ref 27–31)
MCHC RBC AUTO-ENTMCNC: 33.5 G/DL (ref 32–36)
MCV RBC AUTO: 89.6 FL (ref 80–99)
PLATELET # BLD AUTO: 233 10*3/MM3 (ref 150–450)
PMV BLD AUTO: 10.4 FL (ref 6–12)
POTASSIUM BLD-SCNC: 4.4 MMOL/L (ref 3.5–5.5)
PROT SERPL-MCNC: 7 G/DL (ref 5.7–8.2)
PSA SERPL-MCNC: 0.43 NG/ML (ref 0–4)
RBC # BLD AUTO: 4.7 10*6/MM3 (ref 4.2–5.76)
SODIUM BLD-SCNC: 138 MMOL/L (ref 132–146)
TRIGL SERPL-MCNC: 140 MG/DL (ref 0–150)
WBC NRBC COR # BLD: 6.47 10*3/MM3 (ref 3.5–10.8)

## 2018-07-06 PROCEDURE — 80053 COMPREHEN METABOLIC PANEL: CPT | Performed by: INTERNAL MEDICINE

## 2018-07-06 PROCEDURE — 85027 COMPLETE CBC AUTOMATED: CPT | Performed by: INTERNAL MEDICINE

## 2018-07-06 PROCEDURE — 80061 LIPID PANEL: CPT | Performed by: INTERNAL MEDICINE

## 2018-07-06 PROCEDURE — 36415 COLL VENOUS BLD VENIPUNCTURE: CPT | Performed by: INTERNAL MEDICINE

## 2018-07-06 PROCEDURE — 99397 PER PM REEVAL EST PAT 65+ YR: CPT | Performed by: INTERNAL MEDICINE

## 2018-07-06 PROCEDURE — G0103 PSA SCREENING: HCPCS | Performed by: INTERNAL MEDICINE

## 2018-07-06 PROCEDURE — 83036 HEMOGLOBIN GLYCOSYLATED A1C: CPT | Performed by: INTERNAL MEDICINE

## 2018-07-06 RX ORDER — LOSARTAN POTASSIUM 100 MG/1
100 TABLET ORAL DAILY
Qty: 90 TABLET | Refills: 3 | Status: SHIPPED | OUTPATIENT
Start: 2018-07-06 | End: 2021-02-18 | Stop reason: SDUPTHER

## 2018-07-06 NOTE — PROGRESS NOTES
Subjective   Jef Mcarthur is a 78 y.o. male.     History of Present Illness   For annual physical and follow up of  HTN on meds, Has been having some mild headaches, cough and fatigue since we went up on the lisinopril.  CAD no chest pain, is followed by cardiology.  Hyperlipidemia on meds, no muscle aches.  Back pain is still there and radiates into right hip at times.    The following portions of the patient's history were reviewed and updated as appropriate: allergies, current medications, past family history, past medical history, past social history, past surgical history and problem list.    Review of Systems   Constitutional: Positive for fatigue. Negative for activity change, appetite change and fever.   HENT: Negative.  Negative for dental problem, ear pain, hearing loss, postnasal drip, rhinorrhea, sinus pressure, sore throat, trouble swallowing and voice change.    Eyes: Negative.  Negative for redness and visual disturbance.   Respiratory: Positive for cough. Negative for chest tightness, shortness of breath and wheezing.    Cardiovascular: Negative.  Negative for chest pain, palpitations and leg swelling.   Gastrointestinal: Negative.  Negative for abdominal distention, abdominal pain, blood in stool, constipation, diarrhea and nausea.   Endocrine: Negative.  Negative for polydipsia and polyuria.   Genitourinary: Negative.  Negative for decreased urine volume, dysuria, hematuria and urgency.   Musculoskeletal: Positive for back pain. Negative for arthralgias and neck pain.   Skin: Negative.  Negative for pallor and rash.   Allergic/Immunologic: Negative.    Neurological: Negative.  Negative for dizziness, tremors, speech difficulty, weakness, numbness and confusion.   Hematological: Negative.  Negative for adenopathy.   Psychiatric/Behavioral: Negative.  Negative for agitation, behavioral problems, dysphoric mood, sleep disturbance, negative for hyperactivity and depressed mood. The patient is not  nervous/anxious.        Objective   Physical Exam   Constitutional: He is oriented to person, place, and time. He appears well-developed and well-nourished.   /80 by me big cuff.   HENT:   Head: Normocephalic and atraumatic.   Right Ear: External ear normal.   Left Ear: External ear normal.   Nose: Nose normal.   Mouth/Throat: Oropharynx is clear and moist.   Eyes: Conjunctivae and EOM are normal. Pupils are equal, round, and reactive to light.   Fundoscopic exam:       The right eye shows no AV nicking and no hemorrhage.        The left eye shows no AV nicking and no hemorrhage.   Neck: Normal range of motion. Neck supple. No thyromegaly present.   Cardiovascular: Normal rate, regular rhythm, normal heart sounds and intact distal pulses.  Exam reveals no gallop and no friction rub.    No murmur heard.  Carotids normal.   Pulmonary/Chest: Effort normal and breath sounds normal. No respiratory distress. He has no wheezes. He has no rales. He exhibits no tenderness.   Abdominal: Soft. Bowel sounds are normal. He exhibits no distension and no mass. There is no tenderness. No hernia.   Genitourinary: Rectum normal, testes normal and penis normal.   Musculoskeletal: Normal range of motion. He exhibits no edema.   Lymphadenopathy:     He has no cervical adenopathy.   Neurological: He is alert and oriented to person, place, and time. He has normal reflexes. No cranial nerve deficit.   Skin: Skin is warm and dry.   Psychiatric: He has a normal mood and affect. His behavior is normal. Judgment and thought content normal.   Nursing note and vitals reviewed.        Assessment/Plan   Jef was seen today for essential hypertension.    Diagnoses and all orders for this visit:    Coronary arteriosclerosis in native artery    Essential hypertension    Ischemic heart disease    Mixed hyperlipidemia  -     Comprehensive Metabolic Panel  -     Lipid Panel    Chronic midline low back pain with right-sided sciatica    Health  maintenance examination  -     PSA Screen  -     CBC (No Diff)    Other orders  -     losartan (COZAAR) 100 MG tablet; Take 1 tablet by mouth Daily.    Will change lisinopril to losartan.  He will try daily back exercises given to him by therapist for back.  Labs as noted.  Counseled on diet and immunizations.  Health care maintenance is up to date.  Same meds otherwise.  Recheck 4 months.

## 2018-08-19 DIAGNOSIS — I10 ESSENTIAL HYPERTENSION: ICD-10-CM

## 2018-08-20 RX ORDER — HYDROCHLOROTHIAZIDE 12.5 MG/1
TABLET ORAL
Qty: 30 TABLET | Refills: 4 | Status: SHIPPED | OUTPATIENT
Start: 2018-08-20 | End: 2021-02-18

## 2018-08-24 RX ORDER — SIMVASTATIN 20 MG
TABLET ORAL
Qty: 30 TABLET | Refills: 0 | Status: SHIPPED | OUTPATIENT
Start: 2018-08-24 | End: 2018-09-03 | Stop reason: SDUPTHER

## 2018-09-04 RX ORDER — SIMVASTATIN 20 MG
TABLET ORAL
Qty: 30 TABLET | Refills: 0 | Status: SHIPPED | OUTPATIENT
Start: 2018-09-04 | End: 2021-02-18 | Stop reason: SDUPTHER

## 2019-12-12 NOTE — PROGRESS NOTES
Physical Therapy Daily Progress Note    TOTAL TIME: 60 MINUTES    Jef Mcarthur reports: SHOULDER FEELS THE SAME; WAS ABLE TO DRIVE HERE TODAY      Objective   See Exercise, Manual, and Modality Logs for complete treatment.     THERAPEUTIC EXERCISES/ACTIVITIES ADDED TODAY: SEE FLOW SHEETS    Assessment/Plan  WEAKNESS AND PAIN WITH ER OF LEFT SHOULDER MAY INDICATE A RTC TEAR; PATIENT NEEDS CONTINUED PHYSICAL THERAPY IN ORDER TO ACHIEVE FULL ROM, STRENGTH AND FUNCTION WITH NO REPORTS OF PAIN IN ORDER TO RTW WITHOUT RESTRICTIONS AND WITHOUT PAIN OR DYSFUNCTION       Progress per Plan of Care           Manual Therapy:         mins  14540;  Therapeutic Exercise:    50     mins  36184;     Neuromuscular Rahel:        mins  40765;    Therapeutic Activity:          mins  52214;     Gait Training:           mins  00614;     Ultrasound:          mins  56305;    Electrical Stimulation:         mins  88997 ( );  Dry Needling          mins self-pay    Timed Treatment:   50   mins   Total Treatment:     60   mins    Melvin Rodgers, PT  Physical Therapist   yes

## 2021-02-18 ENCOUNTER — OFFICE VISIT (OUTPATIENT)
Dept: FAMILY MEDICINE CLINIC | Facility: CLINIC | Age: 81
End: 2021-02-18

## 2021-02-18 VITALS
DIASTOLIC BLOOD PRESSURE: 64 MMHG | HEART RATE: 60 BPM | OXYGEN SATURATION: 99 % | WEIGHT: 239 LBS | HEIGHT: 69 IN | BODY MASS INDEX: 35.4 KG/M2 | SYSTOLIC BLOOD PRESSURE: 128 MMHG

## 2021-02-18 DIAGNOSIS — K21.9 GASTROESOPHAGEAL REFLUX DISEASE WITHOUT ESOPHAGITIS: ICD-10-CM

## 2021-02-18 DIAGNOSIS — M54.41 CHRONIC MIDLINE LOW BACK PAIN WITH RIGHT-SIDED SCIATICA: ICD-10-CM

## 2021-02-18 DIAGNOSIS — G47.33 OSA (OBSTRUCTIVE SLEEP APNEA): ICD-10-CM

## 2021-02-18 DIAGNOSIS — G89.29 CHRONIC MIDLINE LOW BACK PAIN WITH RIGHT-SIDED SCIATICA: ICD-10-CM

## 2021-02-18 DIAGNOSIS — I10 ESSENTIAL HYPERTENSION: ICD-10-CM

## 2021-02-18 DIAGNOSIS — E78.2 MIXED HYPERLIPIDEMIA: Primary | ICD-10-CM

## 2021-02-18 DIAGNOSIS — Z00.00 ENCOUNTER FOR WELL ADULT EXAM WITHOUT ABNORMAL FINDINGS: ICD-10-CM

## 2021-02-18 DIAGNOSIS — I25.10 CORONARY ARTERIOSCLEROSIS IN NATIVE ARTERY: ICD-10-CM

## 2021-02-18 DIAGNOSIS — Z23 NEED FOR VACCINATION: ICD-10-CM

## 2021-02-18 DIAGNOSIS — H61.92 SKIN LESION OF LEFT EAR: ICD-10-CM

## 2021-02-18 DIAGNOSIS — E66.09 CLASS 1 OBESITY DUE TO EXCESS CALORIES WITHOUT SERIOUS COMORBIDITY WITH BODY MASS INDEX (BMI) OF 34.0 TO 34.9 IN ADULT: ICD-10-CM

## 2021-02-18 PROBLEM — J34.2 DEVIATED NASAL SEPTUM: Status: ACTIVE | Noted: 2017-10-18

## 2021-02-18 PROBLEM — R26.89 FUNCTIONAL GAIT ABNORMALITY: Status: ACTIVE | Noted: 2021-02-18

## 2021-02-18 PROBLEM — J30.9 ALLERGIC RHINITIS: Status: ACTIVE | Noted: 2017-10-18

## 2021-02-18 PROBLEM — H91.90 HEARING LOSS: Status: ACTIVE | Noted: 2017-10-18

## 2021-02-18 PROCEDURE — 99214 OFFICE O/P EST MOD 30 MIN: CPT | Performed by: FAMILY MEDICINE

## 2021-02-18 PROCEDURE — 90715 TDAP VACCINE 7 YRS/> IM: CPT | Performed by: FAMILY MEDICINE

## 2021-02-18 PROCEDURE — 90471 IMMUNIZATION ADMIN: CPT | Performed by: FAMILY MEDICINE

## 2021-02-18 RX ORDER — SIMVASTATIN 20 MG
20 TABLET ORAL DAILY
Qty: 90 TABLET | Refills: 1 | Status: SHIPPED | OUTPATIENT
Start: 2021-02-18

## 2021-02-18 RX ORDER — METOPROLOL SUCCINATE 25 MG/1
25 TABLET, EXTENDED RELEASE ORAL DAILY
Qty: 90 TABLET | Refills: 1 | Status: SHIPPED | OUTPATIENT
Start: 2021-02-18

## 2021-02-18 RX ORDER — HYDROCHLOROTHIAZIDE 25 MG/1
25 TABLET ORAL DAILY
Qty: 90 TABLET | Refills: 1 | Status: SHIPPED | OUTPATIENT
Start: 2021-02-18

## 2021-02-18 RX ORDER — METOPROLOL SUCCINATE 25 MG/1
TABLET, EXTENDED RELEASE ORAL
COMMUNITY
Start: 2021-01-26 | End: 2021-02-18 | Stop reason: SDUPTHER

## 2021-02-18 RX ORDER — HYDROCHLOROTHIAZIDE 25 MG/1
25 TABLET ORAL DAILY
COMMUNITY
End: 2021-02-18 | Stop reason: SDUPTHER

## 2021-02-18 RX ORDER — OMEPRAZOLE 20 MG/1
20 CAPSULE, DELAYED RELEASE ORAL
Qty: 45 CAPSULE | Refills: 1 | Status: SHIPPED | OUTPATIENT
Start: 2021-02-18

## 2021-02-18 RX ORDER — LOSARTAN POTASSIUM 100 MG/1
100 TABLET ORAL DAILY
Qty: 90 TABLET | Refills: 3 | Status: SHIPPED | OUTPATIENT
Start: 2021-02-18

## 2021-02-18 RX ORDER — ZOSTER VACCINE RECOMBINANT, ADJUVANTED 50 MCG/0.5
0.5 KIT INTRAMUSCULAR ONCE
Qty: 1 EACH | Refills: 0 | Status: SHIPPED | OUTPATIENT
Start: 2021-02-18 | End: 2021-02-18

## 2021-02-18 NOTE — PROGRESS NOTES
Chief Complaint  Med Management (New to establish Care)    Subjective          Jef Mcarthur presents to [facility] for     History of Present Illness    Pt is a  and [facility] He is here to establish with us for his primary care.    Pt has controlled DM.    He has CAD and had a stent in .    He had a colonoscopy at age 76 and was told he does not need another colonoscopy.    His wife  this past  with lung CA. He lives with his daughter in an apartment in their basement.    He states he has a skin lesion on his left ear that he noticed a few months ago. He states he [facility] that looked at his left ear skin lesion and did not think it was cancer.    He has chronic LBP with right sided sciatica.  He has had Xrays of hsi back approximately 2 years ago.  He has not had an MRI.  He has done 90 days of PT. He used to have pain all the way down to his ankle which he does not have anymore. He struggles to have a normal gait.     He states he has RAFAEL but is noncompliant and can not use the CPAP because he had a hard time getting used it.    Health Maintenance Due   Topic Date Due   • TDAP/TD VACCINES (1 - Tdap) 1960   • ZOSTER VACCINE (1 of 2) 1991   • HEPATITIS C SCREENING  2016   • LIPID PANEL  2019   • ANNUAL PHYSICAL  2019        reports that he quit smoking about 42 years ago. His smoking use included cigarettes. He started smoking about 60 years ago. He has a 8.50 pack-year smoking history. He has never used smokeless tobacco. He reports current alcohol use. He reports that he does not use drugs.    PHQ-9 Depression Screening  Little interest or pleasure in doing things?     Feeling down, depressed, or hopeless?     Trouble falling or staying asleep, or sleeping too much?     Feeling tired or having little energy?     Poor appetite or overeating?     Feeling bad about  yourself - or that you are a failure or have let yourself or your family down?     Trouble concentrating on things, such as reading the newspaper or watching television?     Moving or speaking so slowly that other people could have noticed? Or the opposite - being so fidgety or restless that you have been moving around a lot more than usual?     Thoughts that you would be better off dead, or of hurting yourself in some way?     PHQ-9 Total Score     If you checked off any problems, how difficult have these problems made it for you to do your work, take care of things at home, or get along with other people?       Lab Results   Component Value Date    WBC 6.47 07/06/2018    HGB 14.1 07/06/2018    HCT 42.1 07/06/2018    MCV 89.6 07/06/2018     07/06/2018     Lab Results   Component Value Date    GLUCOSE 113 (H) 07/06/2018    BUN 19 07/06/2018    CREATININE 1.17 07/06/2018    EGFRIFNONA 60 (L) 07/06/2018    BCR 16.2 07/06/2018    K 4.4 07/06/2018    CO2 25.0 07/06/2018    CALCIUM 9.3 07/06/2018    ALBUMIN 4.73 07/06/2018    AST 23 07/06/2018    ALT 30 07/06/2018     No results found for: TSH  Lab Results   Component Value Date    HGBA1C 6.20 (H) 07/06/2018     Brief Urine Lab Results     None          BP Readings from Last 12 Encounters:   02/18/21 128/64   07/06/18 142/84   03/28/18 169/78   03/02/18 124/58   02/28/18 164/68   02/23/18 138/72   01/17/18 164/78   01/11/18 149/89   12/18/17 162/78   11/20/17 126/67   11/17/17 144/82   10/30/17 156/72       Wt Readings from Last 12 Encounters:   02/18/21 108 kg (239 lb)   07/06/18 101 kg (223 lb)   03/28/18 101 kg (221 lb 9 oz)   03/02/18 102 kg (224 lb 9.6 oz)   02/28/18 102 kg (224 lb 3.3 oz)   02/23/18 102 kg (225 lb)   01/17/18 102 kg (224 lb 13.9 oz)   01/11/18 103 kg (227 lb)   12/18/17 100 kg (220 lb 7.4 oz)   11/20/17 98.4 kg (217 lb)   11/17/17 99.7 kg (219 lb 12.8 oz)   10/30/17 96.6 kg (213 lb)       Objective   Vital Signs:   /64   Pulse 60    "Ht 175.3 cm (69\")   Wt 108 kg (239 lb)   SpO2 99%   BMI 35.29 kg/m²     Physical Exam  Vitals signs and nursing note reviewed.   Constitutional:       General: He is not in acute distress.     Appearance: Normal appearance. He is well-developed. He is not diaphoretic.   HENT:      Head: Normocephalic and atraumatic.      Right Ear: External ear normal.      Left Ear: External ear normal.      Nose: Nose normal.      Mouth/Throat:      Pharynx: No oropharyngeal exudate.   Eyes:      General: Lids are normal. No scleral icterus.        Right eye: No discharge.         Left eye: No discharge.   Neck:      Musculoskeletal: Full passive range of motion without pain, normal range of motion and neck supple. No edema.      Thyroid: No thyromegaly.      Trachea: Trachea normal. No tracheal deviation.   Cardiovascular:      Rate and Rhythm: Normal rate and regular rhythm.      Heart sounds: Normal heart sounds. No murmur. No friction rub. No gallop.    Pulmonary:      Effort: Pulmonary effort is normal. No tachypnea, bradypnea or respiratory distress.      Breath sounds: Normal breath sounds. No stridor. No decreased breath sounds, wheezing or rales.   Chest:      Chest wall: No tenderness.   Lymphadenopathy:      Head:      Right side of head: No submental, submandibular, tonsillar, preauricular, posterior auricular or occipital adenopathy.      Left side of head: No submental, submandibular, tonsillar, preauricular, posterior auricular or occipital adenopathy.      Cervical: No cervical adenopathy.      Right cervical: No superficial, deep or posterior cervical adenopathy.     Left cervical: No superficial, deep or posterior cervical adenopathy.   Skin:     General: Skin is warm and dry.      Capillary Refill: Capillary refill takes less than 2 seconds.      Coloration: Skin is not pale.      Findings: No erythema or rash.      Nails: There is no clubbing.     Neurological:      Mental Status: He is alert and oriented to " person, place, and time. He is not disoriented.      Deep Tendon Reflexes: Reflexes are normal and symmetric.   Psychiatric:         Behavior: Behavior normal.        Result Review :                 Assessment and Plan    Problem List Items Addressed This Visit     Coronary arteriosclerosis in native artery    Overview     2/18/2021:  Currently taking ASA 81 mg. Continue current treatment.         Current Assessment & Plan     Currently taking ASA 81 mg. Continue current treatment.           Relevant Medications    metoprolol succinate XL (TOPROL-XL) 25 MG 24 hr tablet    Mixed hyperlipidemia - Primary    Overview     2/18/2021: Controlled with Simvastatin 20 mg daily. No medication side effects. Continue current treatment.         Current Assessment & Plan     Controlled with Simvastatin 20 mg daily. No medication side effects. Continue current treatment.           Relevant Medications    simvastatin (ZOCOR) 20 MG tablet    Essential hypertension    Overview     2/18/2021: Controlled with Losartan 100 mg daily, HCTZ 25 mg daily, and Metoprolol XL 25 mg daily.         Current Assessment & Plan     Controlled with Losartan 100 mg daily, HCTZ 25 mg daily, and Metoprolol XL 25 mg daily.         Relevant Medications    losartan (Cozaar) 100 MG tablet    hydroCHLOROthiazide (HYDRODIURIL) 25 MG tablet    metoprolol succinate XL (TOPROL-XL) 25 MG 24 hr tablet    Chronic midline low back pain with right-sided sciatica    Overview     2/18/2021: He has chronic LBP with right sided sciatica.  He has had Xrays of hsi back approximately 2 years ago.  He has not had an MRI.  He has done 90 days of PT. He used to have pain all the way down to his ankle which he does not have anymore. He struggles to have a normal gait. 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  He prefers Open MRI.           Relevant Orders    Ambulatory Referral to Physical Therapy Evaluate and treat    Class 1  obesity due to excess calories without serious comorbidity with body mass index (BMI) of 34.0 to 34.9 in adult    Overview     2/18/2021: Pt is going to work on improving his diet and exercise and lose weight.         Current Assessment & Plan     Pt is going to work on improving his diet and exercise and lose weight.           Skin lesion of left ear    Overview     2/18/2021: He states he has a skin lesion on his left ear that he noticed a few months ago. He states [facility] his left ear skin lesion and did not think it was cancer. Refer to dermatology.           Relevant Orders    Ambulatory Referral to Dermatology (Completed)    Gastroesophageal reflux disease without esophagitis    Overview     2/18/2021:  He has been taking Omeprazole for years. He is not sure if he needs to stay on I tor not but has been taking it because it was prescribed for him.  Change Omeprazole to QOD PRN.         Current Assessment & Plan     Change Omeprazole to QOD PRN.         Relevant Medications    omeprazole (PrilOSEC) 20 MG capsule    RAFAEL (obstructive sleep apnea)    Overview     2/18/2021: He states he has RAFAEL but is noncompliant and can not use the CPAP because he had a hard time getting used it. Pt encouraged to keep trying to get used to the CPAP and to take it off whenever it starts bothering him and go to sleep.           Current Assessment & Plan     Pt encouraged to keep trying to get used to the CPAP and to take it off whenever it starts bothering him and go to sleep.             Other Visit Diagnoses     Encounter for well adult exam without abnormal findings        Relevant Orders    CBC & Differential    Comprehensive Metabolic Panel    Lipid Panel With / Chol / HDL Ratio    TSH    UA / M With / Rflx Culture([facility] ONLY) - Urine, Clean Catch    Need for vaccination        Relevant Medications    Zoster Vac Recomb Adjuvanted (Shingrix) 50 MCG/0.5ML reconstituted suspension     Other Relevant Orders    Tdap Vaccine Greater Than or Equal To 6yo IM          Follow Up   No follow-ups on file.  Patient was given instructions and counseling regarding his condition or for health maintenance advice. Please see specific information pulled into the AVS if appropriate.   I spent over 45 minutes with the patient, of which more than 50% was counseling. The patient was also counseled on diet and exercise to minimize or eliminate concentrated sweets and sweetened beverages, as well as cutting back on breads and pastas.

## 2021-02-18 NOTE — ASSESSMENT & PLAN NOTE
Controlled with Simvastatin 20 mg daily. No medication side effects. Continue current treatment.

## 2021-02-18 NOTE — ASSESSMENT & PLAN NOTE
Pt encouraged to keep trying to get used to the CPAP and to take it off whenever it starts bothering him and go to sleep.

## 2021-02-25 PROBLEM — R73.01 IFG (IMPAIRED FASTING GLUCOSE): Status: ACTIVE | Noted: 2021-02-25

## 2021-02-25 LAB
ALBUMIN SERPL-MCNC: 4.4 G/DL (ref 3.5–5.2)
ALBUMIN/GLOB SERPL: 2.1 G/DL
ALP SERPL-CCNC: 47 U/L (ref 39–117)
ALT SERPL-CCNC: 26 U/L (ref 1–41)
APPEARANCE UR: CLEAR
AST SERPL-CCNC: 22 U/L (ref 1–40)
BACTERIA #/AREA URNS HPF: NORMAL /HPF
BASOPHILS # BLD AUTO: 0.07 10*3/MM3 (ref 0–0.2)
BASOPHILS NFR BLD AUTO: 1 % (ref 0–1.5)
BILIRUB SERPL-MCNC: 0.4 MG/DL (ref 0–1.2)
BILIRUB UR QL STRIP: NEGATIVE
BUN SERPL-MCNC: 19 MG/DL (ref 8–23)
BUN/CREAT SERPL: 21.6 (ref 7–25)
CALCIUM SERPL-MCNC: 9.3 MG/DL (ref 8.6–10.5)
CHLORIDE SERPL-SCNC: 98 MMOL/L (ref 98–107)
CHOLEST SERPL-MCNC: 126 MG/DL (ref 0–200)
CHOLEST/HDLC SERPL: 3.07 {RATIO}
CO2 SERPL-SCNC: 23.1 MMOL/L (ref 22–29)
COLOR UR: YELLOW
CREAT SERPL-MCNC: 0.88 MG/DL (ref 0.76–1.27)
EOSINOPHIL # BLD AUTO: 0.27 10*3/MM3 (ref 0–0.4)
EOSINOPHIL NFR BLD AUTO: 3.9 % (ref 0.3–6.2)
EPI CELLS #/AREA URNS HPF: NORMAL /HPF (ref 0–10)
ERYTHROCYTE [DISTWIDTH] IN BLOOD BY AUTOMATED COUNT: 12.9 % (ref 12.3–15.4)
GLOBULIN SER CALC-MCNC: 2.1 GM/DL
GLUCOSE SERPL-MCNC: 111 MG/DL (ref 65–99)
GLUCOSE UR QL: NEGATIVE
HCT VFR BLD AUTO: 42.7 % (ref 37.5–51)
HDLC SERPL-MCNC: 41 MG/DL (ref 40–60)
HGB BLD-MCNC: 14.1 G/DL (ref 13–17.7)
HGB UR QL STRIP: NEGATIVE
IMM GRANULOCYTES # BLD AUTO: 0.03 10*3/MM3 (ref 0–0.05)
IMM GRANULOCYTES NFR BLD AUTO: 0.4 % (ref 0–0.5)
KETONES UR QL STRIP: NEGATIVE
LDLC SERPL CALC-MCNC: 65 MG/DL (ref 0–100)
LEUKOCYTE ESTERASE UR QL STRIP: NEGATIVE
LYMPHOCYTES # BLD AUTO: 2.03 10*3/MM3 (ref 0.7–3.1)
LYMPHOCYTES NFR BLD AUTO: 29.1 % (ref 19.6–45.3)
MCH RBC QN AUTO: 29.9 PG (ref 26.6–33)
MCHC RBC AUTO-ENTMCNC: 33 G/DL (ref 31.5–35.7)
MCV RBC AUTO: 90.5 FL (ref 79–97)
MICRO URNS: NORMAL
MICRO URNS: NORMAL
MONOCYTES # BLD AUTO: 0.49 10*3/MM3 (ref 0.1–0.9)
MONOCYTES NFR BLD AUTO: 7 % (ref 5–12)
NEUTROPHILS # BLD AUTO: 4.09 10*3/MM3 (ref 1.7–7)
NEUTROPHILS NFR BLD AUTO: 58.6 % (ref 42.7–76)
NITRITE UR QL STRIP: NEGATIVE
NRBC BLD AUTO-RTO: 0 /100 WBC (ref 0–0.2)
PH UR STRIP: 6 [PH] (ref 5–7.5)
PLATELET # BLD AUTO: 251 10*3/MM3 (ref 140–450)
POTASSIUM SERPL-SCNC: 4.3 MMOL/L (ref 3.5–5.2)
PROT SERPL-MCNC: 6.5 G/DL (ref 6–8.5)
PROT UR QL STRIP: NEGATIVE
RBC # BLD AUTO: 4.72 10*6/MM3 (ref 4.14–5.8)
RBC #/AREA URNS HPF: NORMAL /HPF (ref 0–2)
SODIUM SERPL-SCNC: 134 MMOL/L (ref 136–145)
SP GR UR: 1.01 (ref 1–1.03)
TRIGL SERPL-MCNC: 105 MG/DL (ref 0–150)
TSH SERPL DL<=0.005 MIU/L-ACNC: 2.79 UIU/ML (ref 0.27–4.2)
URINALYSIS REFLEX: NORMAL
UROBILINOGEN UR STRIP-MCNC: 0.2 MG/DL (ref 0.2–1)
VLDLC SERPL CALC-MCNC: 20 MG/DL (ref 5–40)
WBC # BLD AUTO: 6.98 10*3/MM3 (ref 3.4–10.8)
WBC #/AREA URNS HPF: NORMAL /HPF (ref 0–5)

## 2021-02-25 NOTE — PROGRESS NOTES
Please call the patient regarding his result(s).  Please let him know that his blood sugar is slightly elevated and the rest of his labs are normal.  Please add impaired fasting glucose to the reason for visit for his upcoming visit on March 2, 2021.

## 2021-03-02 ENCOUNTER — OFFICE VISIT (OUTPATIENT)
Dept: FAMILY MEDICINE CLINIC | Facility: CLINIC | Age: 81
End: 2021-03-02

## 2021-03-02 VITALS
SYSTOLIC BLOOD PRESSURE: 142 MMHG | BODY MASS INDEX: 35.1 KG/M2 | WEIGHT: 237 LBS | HEIGHT: 69 IN | TEMPERATURE: 97 F | DIASTOLIC BLOOD PRESSURE: 78 MMHG | OXYGEN SATURATION: 98 % | HEART RATE: 64 BPM

## 2021-03-02 DIAGNOSIS — I10 ESSENTIAL HYPERTENSION: ICD-10-CM

## 2021-03-02 DIAGNOSIS — Z79.899 HIGH RISK MEDICATION USE: ICD-10-CM

## 2021-03-02 DIAGNOSIS — Z00.00 ENCOUNTER FOR WELL ADULT EXAM WITHOUT ABNORMAL FINDINGS: Primary | ICD-10-CM

## 2021-03-02 DIAGNOSIS — E78.2 MIXED HYPERLIPIDEMIA: ICD-10-CM

## 2021-03-02 PROCEDURE — 99397 PER PM REEVAL EST PAT 65+ YR: CPT | Performed by: FAMILY MEDICINE

## 2021-03-02 NOTE — PATIENT INSTRUCTIONS
Exercising to Lose Weight  Exercise is structured, repetitive physical activity to improve fitness and health. Getting regular exercise is important for everyone. It is especially important if you are overweight. Being overweight increases your risk of heart disease, stroke, diabetes, high blood pressure, and several types of cancer. Reducing your calorie intake and exercising can help you lose weight.  Exercise is usually categorized as moderate or vigorous intensity. To lose weight, most people need to do a certain amount of moderate-intensity or vigorous-intensity exercise each week.  Moderate-intensity exercise    Moderate-intensity exercise is any activity that gets you moving enough to burn at least three times more energy (calories) than if you were sitting.  Examples of moderate exercise include:  · Walking a mile in 15 minutes.  · Doing light yard work.  · Biking at an easy pace.  Most people should get at least 150 minutes (2 hours and 30 minutes) a week of moderate-intensity exercise to maintain their body weight.  Vigorous-intensity exercise  Vigorous-intensity exercise is any activity that gets you moving enough to burn at least six times more calories than if you were sitting. When you exercise at this intensity, you should be working hard enough that you are not able to carry on a conversation.  Examples of vigorous exercise include:  · Running.  · Playing a team sport, such as football, basketball, and soccer.  · Jumping rope.  Most people should get at least 75 minutes (1 hour and 15 minutes) a week of vigorous-intensity exercise to maintain their body weight.  How can exercise affect me?  When you exercise enough to burn more calories than you eat, you lose weight. Exercise also reduces body fat and builds muscle. The more muscle you have, the more calories you burn. Exercise also:  · Improves mood.  · Reduces stress and tension.  · Improves your overall fitness, flexibility, and  endurance.  · Increases bone strength.  The amount of exercise you need to lose weight depends on:  · Your age.  · The type of exercise.  · Any health conditions you have.  · Your overall physical ability.  Talk to your health care provider about how much exercise you need and what types of activities are safe for you.  What actions can I take to lose weight?  Nutrition    · Make changes to your diet as told by your health care provider or diet and nutrition specialist (dietitian). This may include:  ? Eating fewer calories.  ? Eating more protein.  ? Eating less unhealthy fats.  ? Eating a diet that includes fresh fruits and vegetables, whole grains, low-fat dairy products, and lean protein.  ? Avoiding foods with added fat, salt, and sugar.  · Drink plenty of water while you exercise to prevent dehydration or heat stroke.  Activity  · Choose an activity that you enjoy and set realistic goals. Your health care provider can help you make an exercise plan that works for you.  · Exercise at a moderate or vigorous intensity most days of the week.  ? The intensity of exercise may vary from person to person. You can tell how intense a workout is for you by paying attention to your breathing and heartbeat. Most people will notice their breathing and heartbeat get faster with more intense exercise.  · Do resistance training twice each week, such as:  ? Push-ups.  ? Sit-ups.  ? Lifting weights.  ? Using resistance bands.  · Getting short amounts of exercise can be just as helpful as long structured periods of exercise. If you have trouble finding time to exercise, try to include exercise in your daily routine.  ? Get up, stretch, and walk around every 30 minutes throughout the day.  ? Go for a walk during your lunch break.  ? Park your car farther away from your destination.  ? If you take public transportation, get off one stop early and walk the rest of the way.  ? Make phone calls while standing up and walking  around.  ? Take the stairs instead of elevators or escalators.  · Wear comfortable clothes and shoes with good support.  · Do not exercise so much that you hurt yourself, feel dizzy, or get very short of breath.  Where to find more information  · [place] Department of Health and Human Services: www.hhs.gov  · Centers for Disease Control and Prevention (CDC): www.cdc.gov  Contact a health care provider:  · Before starting a new exercise program.  · If you have questions or concerns about your weight.  · If you have a medical problem that keeps you from exercising.  Get help right away if you have any of the following while exercising:  · Injury.  · Dizziness.  · Difficulty breathing or shortness of breath that does not go away when you stop exercising.  · Chest pain.  · Rapid heartbeat.  Summary  · Being overweight increases your risk of heart disease, stroke, diabetes, high blood pressure, and several types of cancer.  · Losing weight happens when you burn more calories than you eat.  · Reducing the amount of calories you eat in addition to getting regular moderate or vigorous exercise each week helps you lose weight.  This information is not intended to replace advice given to you by your health care provider. Make sure you discuss any questions you have with your health care provider.  Document Revised: 12/31/2018 Document Reviewed: 12/31/2018  Elsevier Patient Education © 2020 [employer]    Preventive Dental Care, Adult  Preventive dental care includes seeing a dentist regularly and practicing good dental care (oral hygiene) at home. These actions can help to prevent cavities, root canal problems, gum disease (gingivitis), tooth loss, and other tooth problems. Regular dental exams may also help your health care provider diagnose other medical problems. Many diseases, including mouth cancers, have early signs that can be found during a preventive dental care visit.  What can I expect for my preventive dental care  visit?  Counseling  At your visit, your dentist may ask you about:  · Your overall health and diet.  · Any new symptoms, such as:  ? Bleeding gums.  ? Mouth, tooth, or jaw pain.  ? Dull headache.  · Using a mouthguard for sports or because of teeth grinding.  · The need or desire to get braces to straighten teeth (orthodontic care).  Physical exam  Your dentist will do a mouth (oral) exam to check for:  · Cavities.  · Gum disease or other problems.  · Signs of cancer.  · Neck swelling or lumps.  · Abnormal jaw movement or pain in the jaw joint.  · Signs of teeth grinding, such as loose or fractured teeth.  Other services  You may also have:  · Dental X-rays.  · Your teeth cleaned.  Follow these instructions at home:  Oral health         · Brush with fluoride toothpaste every morning and night. If possible, brush within 10 minutes after every meal. Ask your dentist for toothpaste recommendations.  · Floss at least one time every day.  · Check your teeth for white or brown spots after brushing. These may be signs of cavities.  · Check your gums for swelling or bleeding. These may be signs of gum disease.  · Take over-the-counter and prescription medicines only as told by your dentist.  · If you have a permanent tooth knocked out:  ? Find the tooth.  ? Pick it up by the top (crown) with a tissue or gauze.  ? Wash the tooth for no more than 10 seconds under cold, running water.  ? Try to put the tooth back into the gum socket.  ? Put the tooth in a glass of milk if you cannot get it back in place.  ? Go to your dentist or an emergency department right away. Take the tooth with you.  Eating and drinking  · Eat a diet that includes plenty of fruits, vegetables, milk and dairy products, whole grains, and proteins. Do not eat a lot of starchy foods or foods with added sugar. Talk with your health care provider if you have questions about following a healthy diet.  · Avoid sodas, sugary snacks, and sticky candies.  · Choose  water or milk instead of fruit juice, sodas, or sports drinks.  General instructions  · Do not use any products that contain nicotine or tobacco, such as cigarettes, e-cigarettes, and chewing tobacco. If you need help quitting, ask your health care provider.  · Do not get mouth piercings.  · Always wear a mouthguard when playing contact or collision sports.  For more information:  · American Dental Association: www.mouthhealthy.org  Contact a dentist if you have:  · Gum, tooth, or jaw pain.  · Red, swollen, or bleeding gums.  · A tooth or teeth that are very sensitive to hot or cold.  · Very bad breath.  · A problem with a filling, crown, implant, or denture.  · A broken or loose tooth.  · A growth or sore in your mouth that is not going away.  · A dry mouth.  What's next?  · See your dentist one or two times each year for oral exams and cleanings.  · If you have an early problem, like a cavity, your dentist will schedule time for you to get treatment. If you have a tooth root problem, gum disease, or a sign of another disease, your dentist may send you to see another health care provider for care.  This information is not intended to replace advice given to you by your health care provider. Make sure you discuss any questions you have with your health care provider.  Document Revised: 10/11/2019 Document Reviewed: 08/05/2019  Elsevier Patient Education © 2020 [employer]

## 2021-03-02 NOTE — PROGRESS NOTES
Chief Complaint  Annual Exam    Subjective          Jef Mcarthur presents to [facility] for     History of Present Illness    Pt is here for his APE.    He has IFG. He started cooking after his wife passed away. He enjoys cooking. He has been cooking pies and cobblers. His grandkids love them. He also makes bread.     Health Maintenance Due   Topic Date Due   • COVID-19 Vaccine (1 of 2) 06/11/1956   • ZOSTER VACCINE (1 of 2) 06/11/1991   • HEPATITIS C SCREENING  05/24/2016   • ANNUAL PHYSICAL  07/07/2019        reports that he quit smoking about 42 years ago. His smoking use included cigarettes. He started smoking about 60 years ago. He has a 8.50 pack-year smoking history. He has never used smokeless tobacco. He reports current alcohol use. He reports that he does not use drugs.    PHQ-9 Depression Screening  Little interest or pleasure in doing things?     Feeling down, depressed, or hopeless?     Trouble falling or staying asleep, or sleeping too much?     Feeling tired or having little energy?     Poor appetite or overeating?     Feeling bad about yourself - or that you are a failure or have let yourself or your family down?     Trouble concentrating on things, such as reading the newspaper or watching television?     Moving or speaking so slowly that other people could have noticed? Or the opposite - being so fidgety or restless that you have been moving around a lot more than usual?     Thoughts that you would be better off dead, or of hurting yourself in some way?     PHQ-9 Total Score     If you checked off any problems, how difficult have these problems made it for you to do your work, take care of things at home, or get along with other people?       Lab Results   Component Value Date    WBC 6.98 02/24/2021    HGB 14.1 02/24/2021    HCT 42.7 02/24/2021    MCV 90.5 02/24/2021     02/24/2021     Lab Results   Component Value Date    GLUCOSE 113 (H) 07/06/2018    BUN  "19 02/24/2021    CREATININE 0.88 02/24/2021    EGFRIFNONA 84 02/24/2021    EGFRIFAFRI 101 02/24/2021    BCR 21.6 02/24/2021    K 4.3 02/24/2021    CO2 23.1 02/24/2021    CALCIUM 9.3 02/24/2021    PROTENTOTREF 6.5 02/24/2021    ALBUMIN 4.40 02/24/2021    LABIL2 2.1 02/24/2021    AST 22 02/24/2021    ALT 26 02/24/2021     Lab Results   Component Value Date    TSH 2.790 02/24/2021     Lab Results   Component Value Date    HGBA1C 6.20 (H) 07/06/2018     Brief Urine Lab Results  (Last result in the past 365 days)      Color   Clarity   Blood   Leuk Est   Nitrite   Protein   CREAT   Urine HCG        02/24/21 1206 Yellow Clear Negative Negative Negative Negative               BP Readings from Last 12 Encounters:   03/02/21 142/78   02/18/21 128/64   07/06/18 142/84   03/28/18 169/78   03/02/18 124/58   02/28/18 164/68   02/23/18 138/72   01/17/18 164/78   01/11/18 149/89   12/18/17 162/78   11/20/17 126/67   11/17/17 144/82       Wt Readings from Last 12 Encounters:   03/02/21 108 kg (237 lb)   02/18/21 108 kg (239 lb)   07/06/18 101 kg (223 lb)   03/28/18 101 kg (221 lb 9 oz)   03/02/18 102 kg (224 lb 9.6 oz)   02/28/18 102 kg (224 lb 3.3 oz)   02/23/18 102 kg (225 lb)   01/17/18 102 kg (224 lb 13.9 oz)   01/11/18 103 kg (227 lb)   12/18/17 100 kg (220 lb 7.4 oz)   11/20/17 98.4 kg (217 lb)   11/17/17 99.7 kg (219 lb 12.8 oz)       Objective   Vital Signs:   /78   Pulse 64   Temp 97 °F (36.1 °C)   Ht 175.3 cm (69\")   Wt 108 kg (237 lb)   SpO2 98%   BMI 35.00 kg/m²     Physical Exam  Vitals signs and nursing note reviewed.   Constitutional:       General: He is not in acute distress.     Appearance: Normal appearance. He is well-developed. He is not diaphoretic.   HENT:      Head: Normocephalic and atraumatic.      Right Ear: External ear normal.      Left Ear: External ear normal.      Nose: Nose normal.      Mouth/Throat:      Pharynx: No oropharyngeal exudate.   Eyes:      General: Lids are normal. No " scleral icterus.        Right eye: No discharge.         Left eye: No discharge.   Neck:      Musculoskeletal: Full passive range of motion without pain, normal range of motion and neck supple. No edema.      Thyroid: No thyromegaly.      Trachea: Trachea normal. No tracheal deviation.   Cardiovascular:      Rate and Rhythm: Normal rate and regular rhythm.      Heart sounds: Normal heart sounds. No murmur. No friction rub. No gallop.    Pulmonary:      Effort: Pulmonary effort is normal. No tachypnea, bradypnea or respiratory distress.      Breath sounds: Normal breath sounds. No stridor. No decreased breath sounds, wheezing or rales.   Chest:      Chest wall: No tenderness.   Lymphadenopathy:      Head:      Right side of head: No submental, submandibular, tonsillar, preauricular, posterior auricular or occipital adenopathy.      Left side of head: No submental, submandibular, tonsillar, preauricular, posterior auricular or occipital adenopathy.      Cervical: No cervical adenopathy.      Right cervical: No superficial, deep or posterior cervical adenopathy.     Left cervical: No superficial, deep or posterior cervical adenopathy.   Skin:     General: Skin is warm and dry.      Capillary Refill: Capillary refill takes less than 2 seconds.      Coloration: Skin is not pale.      Findings: No erythema or rash.      Nails: There is no clubbing.     Neurological:      Mental Status: He is alert and oriented to person, place, and time. He is not disoriented.      Deep Tendon Reflexes: Reflexes are normal and symmetric.   Psychiatric:         Behavior: Behavior normal.        Result Review :                 Assessment and Plan    Problem List Items Addressed This Visit     Mixed hyperlipidemia    Overview     2/18/2021: Controlled with Simvastatin 20 mg daily. No medication side effects. Continue current treatment.         Relevant Orders    Lipid Panel With / Chol / HDL Ratio    Lipid Panel With / Chol / HDL Ratio       Other Visit Diagnoses     Encounter for well adult exam without abnormal findings    -  Primary    Relevant Orders    CBC & Differential    Comprehensive Metabolic Panel    TSH    Lipid Panel With / Chol / HDL Ratio    UA / M With / Rflx Culture([facility] ONLY) - Urine, Clean Catch    High risk medication use        Relevant Orders    CBC & Differential    Comprehensive Metabolic Panel    TSH    Lipid Panel With / Chol / HDL Ratio    UA / M With / Rflx Culture([facility] ONLY) - Urine, Clean Catch    CBC & Differential    Comprehensive Metabolic Panel    Lipid Panel With / Chol / HDL Ratio          Follow Up   Return in about 1 year (around 3/2/2022) for 1. APE with cbc cmp lp tsh ua prior.  2. 6 mo f/u HTN-C, HLD-C, IFG-S cbc cmp lp .  Patient was given instructions and counseling regarding his condition or for health maintenance advice. Please see specific information pulled into the AVS if appropriate.

## 2021-03-06 ENCOUNTER — HOSPITAL ENCOUNTER (EMERGENCY)
Facility: HOSPITAL | Age: 81
Discharge: HOME | End: 2021-03-06
Attending: EMERGENCY MEDICINE | Admitting: EMERGENCY MEDICINE

## 2021-03-06 VITALS
TEMPERATURE: 98.2 F | RESPIRATION RATE: 18 BRPM | HEART RATE: 60 BPM | WEIGHT: 224.7 LBS | HEIGHT: 69 IN | OXYGEN SATURATION: 96 % | DIASTOLIC BLOOD PRESSURE: 79 MMHG | SYSTOLIC BLOOD PRESSURE: 154 MMHG | BODY MASS INDEX: 33.28 KG/M2

## 2021-03-06 DIAGNOSIS — H60.12 CELLULITIS OF EAR, LEFT: Primary | ICD-10-CM

## 2021-03-06 PROCEDURE — 99282 EMERGENCY DEPT VISIT SF MDM: CPT

## 2021-03-06 RX ORDER — SULFAMETHOXAZOLE AND TRIMETHOPRIM 800; 160 MG/1; MG/1
1 TABLET ORAL 2 TIMES DAILY
Qty: 14 TABLET | Refills: 0 | Status: SHIPPED | OUTPATIENT
Start: 2021-03-06 | End: 2021-03-06 | Stop reason: SDUPTHER

## 2021-03-06 RX ORDER — CEPHALEXIN 500 MG/1
500 CAPSULE ORAL 3 TIMES DAILY
Qty: 21 CAPSULE | Refills: 0 | Status: SHIPPED | OUTPATIENT
Start: 2021-03-06 | End: 2021-03-13

## 2021-03-06 RX ORDER — CEPHALEXIN 500 MG/1
500 CAPSULE ORAL 3 TIMES DAILY
Qty: 21 CAPSULE | Refills: 0 | Status: SHIPPED | OUTPATIENT
Start: 2021-03-06 | End: 2021-03-06 | Stop reason: SDUPTHER

## 2021-03-06 RX ORDER — SULFAMETHOXAZOLE AND TRIMETHOPRIM 800; 160 MG/1; MG/1
1 TABLET ORAL 2 TIMES DAILY
Qty: 14 TABLET | Refills: 0 | Status: SHIPPED | OUTPATIENT
Start: 2021-03-06 | End: 2021-03-13

## 2021-03-06 NOTE — ED NOTES
Pt stated that he got new hearing aids about 2 wks ago, pt stated that his ear pain is only with touch.     Evelia Lawler RN  03/06/21 [id number]

## 2021-03-06 NOTE — ED PROVIDER NOTES
EMERGENCY DEPARTMENT ENCOUNTER      Room Number: 10/10    History is provided by the patient, no translation services needed    HPI:    Chief complaint: Ear pain    Location: Left ear, tragus    Quality/Severity: Aching/7 out of 10    Timing/Duration: 3 days    Modifying Factors: Worse with touch    Associated Symptoms: No hearing loss, no discharge    Narrative: Pt is a 80 y.o. male who presents complaining of left ear pain x3 days.  Patient states that he noticed that there is a scab on the area and it is tender to touch.  Patient also complaining of swelling and redness to the ear.  Patient states that he got new hearing aids 2 weeks ago.  Patient denies any new hearing loss.  Patient denies any discharge from the area.  Patient denies any fever      PMD: Wilson Lorenzo Jr., DO    REVIEW OF SYSTEMS  Review of Systems   Constitutional: Negative for chills and fever.   HENT: Positive for ear pain. Negative for ear discharge, hearing loss and rhinorrhea.    Skin: Positive for color change and wound.         PAST MEDICAL HISTORY  Active Ambulatory Problems     Diagnosis Date Noted   • Coronary arteriosclerosis in native artery 06/01/2016   • Mixed hyperlipidemia 06/01/2016   • Essential hypertension 06/01/2016   • Chronic midline low back pain with right-sided sciatica 06/01/2016   • Chronic left shoulder pain 11/17/2017   • Class 1 obesity due to excess calories without serious comorbidity with body mass index (BMI) of 34.0 to 34.9 in adult 11/17/2017   • Allergic rhinitis 10/18/2017   • Deviated nasal septum 10/18/2017   • Hearing loss 10/18/2017   • Skin lesion of left ear 02/18/2021   • Gastroesophageal reflux disease without esophagitis 02/18/2021   • Functional gait abnormality 02/18/2021   • RAFAEL (obstructive sleep apnea) 02/18/2021   • IFG (impaired fasting glucose) 02/25/2021     Resolved Ambulatory Problems     Diagnosis Date Noted   • Ischemic heart disease      Past Medical History:   Diagnosis Date    • Anxiety    • Colon polyps    • Hyperlipidemia    • Hypertension    • Tobacco use        PAST SURGICAL HISTORY  Past Surgical History:   Procedure Laterality Date   • CORONARY ANGIOPLASTY WITH STENT PLACEMENT Left 2010    70% RCA stenosis treated with 3.0 x 18 mm Cordis Cypher IRENA; LVEF 78%.    • TONSILLECTOMY         FAMILY HISTORY  Family History   Problem Relation Age of Onset   • Osteoarthritis Mother    • Stroke Mother        SOCIAL HISTORY  Social History     Socioeconomic History   • Marital status:      Spouse name: Not on file   • Number of children: Not on file   • Years of education: Not on file   • Highest education level: Not on file   Tobacco Use   • Smoking status: Former Smoker     Packs/day: 0.50     Years: 17.00     Pack years: 8.50     Types: Cigarettes     Start date:      Quit date: 1978     Years since quittin.7   • Smokeless tobacco: Never Used   Substance and Sexual Activity   • Alcohol use: Yes     Comment: rare   • Drug use: No   • Sexual activity: Defer       ALLERGIES  Pneumococcal vac polyvalent    No current facility-administered medications for this encounter.    Current Outpatient Medications:   •  acetaminophen (TYLENOL) 500 MG tablet, Take 1,000 mg by mouth Daily., Disp: , Rfl:   •  aspirin 81 MG EC tablet, Take 81 mg by mouth daily., Disp: , Rfl:   •  cephalexin (KEFLEX) 500 MG capsule, Take 1 capsule by mouth 3 (Three) Times a Day for 7 days., Disp: 21 capsule, Rfl: 0  •  hydroCHLOROthiazide (HYDRODIURIL) 25 MG tablet, Take 1 tablet by mouth Daily., Disp: 90 tablet, Rfl: 1  •  losartan (Cozaar) 100 MG tablet, Take 1 tablet by mouth Daily., Disp: 90 tablet, Rfl: 3  •  metoprolol succinate XL (TOPROL-XL) 25 MG 24 hr tablet, Take 1 tablet by mouth Daily., Disp: 90 tablet, Rfl: 1  •  omeprazole (PrilOSEC) 20 MG capsule, Take 1 capsule by mouth Every Other Day As Needed (GERD)., Disp: 45 capsule, Rfl: 1  •  simvastatin (ZOCOR) 20 MG tablet, Take 1 tablet by  mouth Daily., Disp: 90 tablet, Rfl: 1  •  sulfamethoxazole-trimethoprim (BACTRIM DS,SEPTRA DS) 800-160 MG per tablet, Take 1 tablet by mouth 2 (Two) Times a Day for 7 days., Disp: 14 tablet, Rfl: 0    PHYSICAL EXAM  ED Triage Vitals [03/06/21 1049]   Temp Heart Rate Resp BP SpO2   98.2 °F (36.8 °C) 60 18 (!) 187/79 97 %      Temp src Heart Rate Source Patient Position BP Location FiO2 (%)   Oral Monitor Sitting Right arm --       Physical Exam  Vitals and nursing note reviewed.   Constitutional:       Appearance: Normal appearance. He is normal weight.   HENT:      Head: Normocephalic and atraumatic.     Eyes:      Conjunctiva/sclera: Conjunctivae normal.   Cardiovascular:      Rate and Rhythm: Normal rate and regular rhythm.      Heart sounds: Normal heart sounds.   Pulmonary:      Effort: Pulmonary effort is normal. No respiratory distress.      Breath sounds: Normal breath sounds.   Abdominal:      General: Bowel sounds are normal. There is no distension.      Palpations: Abdomen is soft.      Tenderness: There is no abdominal tenderness.   Musculoskeletal:         General: Normal range of motion.      Cervical back: Normal range of motion and neck supple.   Skin:     General: Skin is warm and dry.   Neurological:      Mental Status: He is alert and oriented to person, place, and time.   Psychiatric:         Mood and Affect: Mood and affect normal.           LAB RESULTS  Lab Results (last 24 hours)     ** No results found for the last 24 hours. **                RADIOLOGY  No Radiology Exams Resulted Within Past 24 Hours    I ordered the above radiologic testing and reviewed the results    PROCEDURES  Procedures      PROGRESS AND CONSULTS  ED Course as of Mar 06 1139   Sat Mar 06, 2021   1138 80-year-old male presents to the ED with complaints of left ear pain x3 days.  Patient states that he noticed a scab to the area.  Patient is that there is no discharge.  Patient denies any known injury.  After history and  physical exam patient was noted to show erythema and edema to the tragus of the left ear.  No tenderness with palpation.  Discussed with patient I felt this was most likely due to a cellulitis.  Patient will be discharged home with Bactrim and Keflex.  Discussed with patient that he would need to follow-up with his PCP.  Discussed with patient if his symptoms worsen that he would need to return to the ED.  Patient expressed verbal understanding of plan of care.    [GT]      ED Course User Index  [GT] Marlene James PA-C           MEDICAL DECISION MAKING    MDM       DIAGNOSIS  Final diagnoses:   Cellulitis of ear, left       Latest Documented Vital Signs:  As of 11:39 EST  BP- (!) 187/79 HR- 60 Temp- 98.2 °F (36.8 °C) (Oral) O2 sat- 97%    DISPOSITION  Discharged home        Discussed pertinent labs and imaging findings with the patient/family.  Patient/Family voiced understanding of need to follow-up for recheck, further testing as needed.  Return to the emergency Department warnings were given.         Medication List      New Prescriptions    cephalexin 500 MG capsule  Commonly known as: KEFLEX  Take 1 capsule by mouth 3 (Three) Times a Day for 7 days.     sulfamethoxazole-trimethoprim 800-160 MG per tablet  Commonly known as: BACTRIM DS,SEPTRA DS  Take 1 tablet by mouth 2 (Two) Times a Day for 7 days.           Where to Get Your Medications      These medications were sent to [facility] [address] [place] [address] - [address] [place] [address] [address] - [phone]  - [phone] [address] [address]    Phone: [phone]   · cephalexin 500 MG capsule  · sulfamethoxazole-trimethoprim 800-160 MG per tablet             Follow-up Information     Wilson Lorenzo Jr., DO. Call in 1 day.    Specialties: Family Medicine, Emergency Medicine, Urgent Care  Why: To schedule a follow up appointment  Contact information:  [place] [address] [phone]                      Dictated utilizing Dragon dictation     Marlene James PA-C  03/06/21 1131

## 2021-03-14 ENCOUNTER — HOSPITAL ENCOUNTER (EMERGENCY)
Facility: HOSPITAL | Age: 81
Discharge: HOME | End: 2021-03-14
Attending: EMERGENCY MEDICINE | Admitting: EMERGENCY MEDICINE

## 2021-03-14 VITALS
OXYGEN SATURATION: 98 % | TEMPERATURE: 98.9 F | HEIGHT: 68 IN | WEIGHT: 222 LBS | RESPIRATION RATE: 16 BRPM | BODY MASS INDEX: 33.65 KG/M2 | HEART RATE: 80 BPM

## 2021-03-14 DIAGNOSIS — B34.9 ACUTE VIRAL SYNDROME: Primary | ICD-10-CM

## 2021-03-14 LAB
FLUAV RNA RESP QL NAA+PROBE: NOT DETECTED
FLUBV RNA RESP QL NAA+PROBE: NOT DETECTED
SARS-COV-2 RNA RESP QL NAA+PROBE: NOT DETECTED

## 2021-03-14 PROCEDURE — 99282 EMERGENCY DEPT VISIT SF MDM: CPT | Performed by: EMERGENCY MEDICINE

## 2021-03-14 PROCEDURE — 99283 EMERGENCY DEPT VISIT LOW MDM: CPT

## 2021-03-14 PROCEDURE — C9803 HOPD COVID-19 SPEC COLLECT: HCPCS

## 2021-03-14 PROCEDURE — 87636 SARSCOV2 & INF A&B AMP PRB: CPT | Performed by: EMERGENCY MEDICINE

## 2021-03-14 RX ORDER — NABUMETONE 500 MG/1
500 TABLET, FILM COATED ORAL 2 TIMES DAILY PRN
Qty: 14 TABLET | Refills: 0 | Status: SHIPPED | OUTPATIENT
Start: 2021-03-14 | End: 2021-03-14

## 2021-03-14 RX ORDER — ONDANSETRON 4 MG/1
4 TABLET, ORALLY DISINTEGRATING ORAL EVERY 6 HOURS PRN
Qty: 20 TABLET | Refills: 0 | Status: SHIPPED | OUTPATIENT
Start: 2021-03-14 | End: 2021-03-14

## 2021-03-14 RX ORDER — NABUMETONE 500 MG/1
500 TABLET, FILM COATED ORAL 2 TIMES DAILY PRN
Qty: 14 TABLET | Refills: 0 | Status: SHIPPED | OUTPATIENT
Start: 2021-03-14 | End: 2021-03-21

## 2021-03-14 RX ORDER — ONDANSETRON 4 MG/1
4 TABLET, ORALLY DISINTEGRATING ORAL EVERY 6 HOURS PRN
Qty: 20 TABLET | Refills: 0 | Status: SHIPPED | OUTPATIENT
Start: 2021-03-14 | End: 2021-04-13

## 2021-03-14 NOTE — DISCHARGE INSTRUCTIONS
Your Covid and flu swabs were negative.  Medications as directed.  You may supplement nabumetone with Tylenol as needed for fever, headache, body aches.  Plenty of fluids and rest.  Follow-up with Dr. Lorenzo as above.  Return to ED for worsening symptoms, medical emergencies.

## 2021-03-14 NOTE — ED PROVIDER NOTES
"Subjective   Jef Mcarthur is a 80-year-old white male who presents over concerns of possibly having COVID-19.  Patient has a 2-day history of headache, body aches, fever, chills, nausea, vomiting and diarrhea.  Patient states the headache \"stirs up my stomach\".  Patient has vomited approximately 8 times over the past 48 hours.  States he is drinking plenty of water.  He has had one episode of diarrhea which occurred this morning.  Patient elected to come to the emergency room for evaluation.      History provided by:  Patient      Review of Systems   Constitutional: Positive for chills and fever.   HENT: Negative for rhinorrhea.    Eyes: Negative for redness.   Respiratory: Negative for cough.    Cardiovascular: Negative for chest pain.   Gastrointestinal: Positive for diarrhea, nausea and vomiting. Negative for abdominal pain.   Genitourinary: Negative for dysuria.   Musculoskeletal: Positive for myalgias. Negative for back pain.   Skin: Negative for rash.   Neurological: Positive for headaches. Negative for syncope.   All other systems reviewed and are negative.      Past Medical History:   Diagnosis Date   • Anxiety    • Colon polyps    • Hyperlipidemia    • Hypertension    • Ischemic heart disease    • Tobacco use     Remote tobacco use. Quit in 1978.       Allergies   Allergen Reactions   • Pneumococcal Vac Polyvalent Other (See Comments)       Past Surgical History:   Procedure Laterality Date   • CORONARY ANGIOPLASTY WITH STENT PLACEMENT Left 07/16/2010    70% RCA stenosis treated with 3.0 x 18 mm Cordis Cypher IRENA; LVEF 78%.    • TONSILLECTOMY         Family History   Problem Relation Age of Onset   • Osteoarthritis Mother    • Stroke Mother        Social History     Socioeconomic History   • Marital status:      Spouse name: Not on file   • Number of children: Not on file   • Years of education: Not on file   • Highest education level: Not on file   Tobacco Use   • Smoking status: Former Smoker    "  Packs/day: 0.50     Years: 17.00     Pack years: 8.50     Types: Cigarettes     Start date:      Quit date: 1978     Years since quittin.8   • Smokeless tobacco: Never Used   Substance and Sexual Activity   • Alcohol use: Yes     Comment: rare   • Drug use: No   • Sexual activity: Defer           Objective   Physical Exam  Vitals and nursing note reviewed.   Constitutional:       General: He is not in acute distress.     Appearance: Normal appearance. He is not ill-appearing or toxic-appearing.      Comments: 80-year-old white male who appears in good health for age.  Vital signs are unremarkable.  Patient is friendly and cooperative.  Mildly overweight   HENT:      Head: Normocephalic.      Right Ear: External ear normal.      Left Ear: External ear normal.      Nose: Nose normal.   Eyes:      Extraocular Movements: Extraocular movements intact.      Conjunctiva/sclera: Conjunctivae normal.   Cardiovascular:      Rate and Rhythm: Normal rate and regular rhythm.      Heart sounds: Normal heart sounds. No murmur. No friction rub. No gallop.    Pulmonary:      Effort: Pulmonary effort is normal. No respiratory distress.      Breath sounds: Normal breath sounds.   Abdominal:      General: Bowel sounds are normal.      Palpations: Abdomen is soft.   Musculoskeletal:         General: Normal range of motion.      Cervical back: Normal range of motion and neck supple.   Skin:     General: Skin is warm and dry.      Capillary Refill: Capillary refill takes less than 2 seconds.   Neurological:      General: No focal deficit present.      Mental Status: He is alert and oriented to person, place, and time.   Psychiatric:         Mood and Affect: Mood normal.         Behavior: Behavior normal.         Procedures           ED Course  ED Course as of Mar 14 1042   Sun Mar 14, 2021   1008 Patient concerned that he has COVID-19.  Symptoms certainly consistent with this.  Covid and flu swab.    [SS]   1038 Covid  influenza swabs are negative.  Patient symptoms likely viral in origin.  Will treat symptomatically.I have discussed at length with patient (including family if appropriate) all results, diagnoses, treatment, indications to return to emergency room and follow-up.  Will d/c home.    Prescriptions1-nabumetone2-Zofran    [SS]      ED Course User Index  [SS] Aneudy Murillo MD      Labs Reviewed   COVID-19 AND FLU A/B, NP SWAB IN TRANSPORT MEDIA 8-12 HR TAT - Normal    Narrative:     Fact sheet for providers: https://www.fda.gov/media/469058/download    Fact sheet for patients: https://www.fda.gov/media/180123/download    Test performed by PCR.     My differential diagnosis for vomiting includes but is not limited to viral illness, gastroenteritis, pregnancy related vomiting, cyclic vomiting, food poisoning, alcohol poisoning, medication side effects, overdose, chemical ingestion, chemical exposures, gallbladder disease, appendicitis, bowel obstruction, DKA, AKA and panic attacks.                                       MDM    Final diagnoses:   Acute viral syndrome            Aneudy Murillo MD  03/14/21 1042

## 2021-03-18 ENCOUNTER — OFFICE VISIT (OUTPATIENT)
Dept: FAMILY MEDICINE CLINIC | Facility: CLINIC | Age: 81
End: 2021-03-18

## 2021-03-18 VITALS
TEMPERATURE: 97.5 F | SYSTOLIC BLOOD PRESSURE: 114 MMHG | OXYGEN SATURATION: 98 % | DIASTOLIC BLOOD PRESSURE: 68 MMHG | WEIGHT: 219 LBS | BODY MASS INDEX: 33.19 KG/M2 | HEART RATE: 93 BPM | HEIGHT: 68 IN

## 2021-03-18 DIAGNOSIS — R68.89 FLU-LIKE SYMPTOMS: Primary | ICD-10-CM

## 2021-03-18 PROCEDURE — 99214 OFFICE O/P EST MOD 30 MIN: CPT | Performed by: FAMILY MEDICINE

## 2021-03-18 PROCEDURE — 87804 INFLUENZA ASSAY W/OPTIC: CPT | Performed by: FAMILY MEDICINE

## 2021-03-18 NOTE — PROGRESS NOTES
Chief Complaint  Generalized Body Aches (chills), Headache, and Fatigue (stool is dark, nausea a couple of days ago  medication preventing now)    Subjective          Jef Mcarthur presents to [facility] for     History of Present Illness    Patient states that on Saturday morning he developed headache, muscle aches, fever, chills, and was achy from head to toe.  He had vomiting and diarrhea.  He had a dark stool but no obvious blood.  He has not had any change in his sense of taste or smell.  No shortness of breath.  No cough or sore throat.  He went to the emergency room on Sunday and was tested for COVID-19 and influenza, and states that both those test were negative.  He states he feels so tired and weak that he is having trouble evem having the energy to walk.  He was able to keep down some solid food last night.  Has been eating some chicken bouillon.  He has some swelling in his feet and thinks that he may be consuming too much salt and may have contributed to that.    Health Maintenance Due   Topic Date Due   • COVID-19 Vaccine (1) Never done   • ZOSTER VACCINE (1 of 2) Never done   • HEPATITIS C SCREENING  Never done        reports that he quit smoking about 42 years ago. His smoking use included cigarettes. He started smoking about 60 years ago. He has a 8.50 pack-year smoking history. He has never used smokeless tobacco. He reports current alcohol use. He reports that he does not use drugs.    PHQ-9 Depression Screening  Little interest or pleasure in doing things?     Feeling down, depressed, or hopeless?     Trouble falling or staying asleep, or sleeping too much?     Feeling tired or having little energy?     Poor appetite or overeating?     Feeling bad about yourself - or that you are a failure or have let yourself or your family down?     Trouble concentrating on things, such as reading the newspaper or watching television?     Moving or speaking so slowly that  other people could have noticed? Or the opposite - being so fidgety or restless that you have been moving around a lot more than usual?     Thoughts that you would be better off dead, or of hurting yourself in some way?     PHQ-9 Total Score     If you checked off any problems, how difficult have these problems made it for you to do your work, take care of things at home, or get along with other people?       Lab Results   Component Value Date    WBC 6.98 02/24/2021    HGB 14.1 02/24/2021    HCT 42.7 02/24/2021    MCV 90.5 02/24/2021     02/24/2021     Lab Results   Component Value Date    GLUCOSE 113 (H) 07/06/2018    BUN 19 02/24/2021    CREATININE 0.88 02/24/2021    EGFRIFNONA 84 02/24/2021    EGFRIFAFRI 101 02/24/2021    BCR 21.6 02/24/2021    K 4.3 02/24/2021    CO2 23.1 02/24/2021    CALCIUM 9.3 02/24/2021    PROTENTOTREF 6.5 02/24/2021    ALBUMIN 4.40 02/24/2021    LABIL2 2.1 02/24/2021    AST 22 02/24/2021    ALT 26 02/24/2021     Lab Results   Component Value Date    TSH 2.790 02/24/2021     Lab Results   Component Value Date    HGBA1C 6.20 (H) 07/06/2018     Brief Urine Lab Results  (Last result in the past 365 days)      Color   Clarity   Blood   Leuk Est   Nitrite   Protein   CREAT   Urine HCG        02/24/21 1206 Yellow Clear Negative Negative Negative Negative               BP Readings from Last 12 Encounters:   03/18/21 114/68   03/06/21 154/79   03/02/21 142/78   02/18/21 128/64   07/06/18 142/84   03/28/18 169/78   03/02/18 124/58   02/28/18 164/68   02/23/18 138/72   01/17/18 164/78   01/11/18 149/89   12/18/17 162/78       Wt Readings from Last 12 Encounters:   03/18/21 99.3 kg (219 lb)   03/14/21 101 kg (222 lb)   03/06/21 102 kg (224 lb 11.2 oz)   03/02/21 108 kg (237 lb)   02/18/21 108 kg (239 lb)   07/06/18 101 kg (223 lb)   03/28/18 101 kg (221 lb 9 oz)   03/02/18 102 kg (224 lb 9.6 oz)   02/28/18 102 kg (224 lb 3.3 oz)   02/23/18 102 kg (225 lb)   01/17/18 102 kg (224 lb 13.9 oz)  "  01/11/18 103 kg (227 lb)       Objective   Vital Signs:   /68   Pulse 93   Temp 97.5 °F (36.4 °C)   Ht 172.7 cm (68\")   Wt 99.3 kg (219 lb)   SpO2 98%   BMI 33.30 kg/m²     Physical Exam  Vitals and nursing note reviewed.   Constitutional:       General: He is not in acute distress.     Appearance: Normal appearance. He is well-developed. He is ill-appearing. He is not diaphoretic.   HENT:      Head: Normocephalic and atraumatic.      Right Ear: External ear normal.      Left Ear: External ear normal.      Nose: Nose normal.      Mouth/Throat:      Pharynx: No oropharyngeal exudate.   Eyes:      General: Lids are normal. No scleral icterus.        Right eye: No discharge.         Left eye: No discharge.   Neck:      Thyroid: No thyromegaly.      Trachea: Trachea normal. No tracheal deviation.   Cardiovascular:      Rate and Rhythm: Normal rate and regular rhythm.      Heart sounds: Normal heart sounds. No murmur heard.   No friction rub. No gallop.    Pulmonary:      Effort: Pulmonary effort is normal. No tachypnea, bradypnea or respiratory distress.      Breath sounds: Normal breath sounds. No stridor. No decreased breath sounds, wheezing or rales.   Chest:      Chest wall: No tenderness.   Musculoskeletal:      Cervical back: Full passive range of motion without pain, normal range of motion and neck supple. No edema.   Lymphadenopathy:      Head:      Right side of head: No submental, submandibular, tonsillar, preauricular, posterior auricular or occipital adenopathy.      Left side of head: No submental, submandibular, tonsillar, preauricular, posterior auricular or occipital adenopathy.      Cervical: No cervical adenopathy.      Right cervical: No superficial, deep or posterior cervical adenopathy.     Left cervical: No superficial, deep or posterior cervical adenopathy.   Skin:     General: Skin is warm and dry.      Capillary Refill: Capillary refill takes less than 2 seconds.      Coloration: " Skin is not pale.      Findings: No erythema or rash.      Nails: There is no clubbing.   Neurological:      Mental Status: He is alert and oriented to person, place, and time. He is not disoriented.      Deep Tendon Reflexes: Reflexes are normal and symmetric.   Psychiatric:         Behavior: Behavior normal.        Result Review :                 Assessment and Plan    Problem List Items Addressed This Visit     None      Visit Diagnoses     Flu-like symptoms    -  Primary    Rest. Fluids. If symptoms worsen or new symptoms develop, go to ER.    Relevant Orders    POC Influenza A / B (Completed)    COVID-19,[facility],NP/OP Swab in Transport Media or ESwab 72 HR TAT - Swab, Nasopharynx          Follow Up   No follow-ups on file.  Patient was given instructions and counseling regarding his condition or for health maintenance advice. Please see specific information pulled into the AVS if appropriate.

## 2021-03-19 LAB
EXPIRATION DATE: 0
FLUAV AG NPH QL: NEGATIVE
FLUBV AG NPH QL: NEGATIVE
INTERNAL CONTROL: NORMAL
Lab: 0
SARS-COV-2 RNA RESP QL NAA+PROBE: NOT DETECTED

## 2021-03-25 ENCOUNTER — HOSPITAL ENCOUNTER (OUTPATIENT)
Dept: GENERAL RADIOLOGY | Facility: HOSPITAL | Age: 81
Discharge: HOME | End: 2021-03-25
Admitting: FAMILY MEDICINE

## 2021-03-25 ENCOUNTER — OFFICE VISIT (OUTPATIENT)
Dept: FAMILY MEDICINE CLINIC | Facility: CLINIC | Age: 81
End: 2021-03-25

## 2021-03-25 VITALS
WEIGHT: 219 LBS | TEMPERATURE: 97.6 F | BODY MASS INDEX: 33.19 KG/M2 | DIASTOLIC BLOOD PRESSURE: 78 MMHG | SYSTOLIC BLOOD PRESSURE: 134 MMHG | HEIGHT: 68 IN | OXYGEN SATURATION: 99 % | HEART RATE: 79 BPM

## 2021-03-25 DIAGNOSIS — R53.1 WEAKNESS: ICD-10-CM

## 2021-03-25 DIAGNOSIS — M79.671 RIGHT FOOT PAIN: Primary | ICD-10-CM

## 2021-03-25 DIAGNOSIS — R26.9 ABNORMAL GAIT: ICD-10-CM

## 2021-03-25 PROCEDURE — 73630 X-RAY EXAM OF FOOT: CPT

## 2021-03-25 PROCEDURE — 99214 OFFICE O/P EST MOD 30 MIN: CPT | Performed by: FAMILY MEDICINE

## 2021-03-25 NOTE — PATIENT INSTRUCTIONS
Foot Pain  Many things can cause foot pain. Some common causes are:  · An injury.  · A sprain.  · Arthritis.  · Blisters.  · Bunions.  Follow these instructions at home:  Managing pain, stiffness, and swelling  If directed, put ice on the painful area:  · Put ice in a plastic bag.  · Place a towel between your skin and the bag.  · Leave the ice on for 20 minutes, 2-3 times a day.    Activity  · Do not stand or walk for long periods.  · Return to your normal activities as told by your health care provider. Ask your health care provider what activities are safe for you.  · Do stretches to relieve foot pain and stiffness as told by your health care provider.  · Do not lift anything that is heavier than 10 lb (4.5 kg), or the limit that you are told, until your health care provider says that it is safe. Lifting a lot of weight can put added pressure on your feet.  Lifestyle  · Wear comfortable, supportive shoes that fit you well. Do not wear high heels.  · Keep your feet clean and dry.  General instructions  · Take over-the-counter and prescription medicines only as told by your health care provider.  · Rub your foot gently.  · Pay attention to any changes in your symptoms.  · Keep all follow-up visits as told by your health care provider. This is important.  Contact a health care provider if:  · Your pain does not get better after a few days of self-care.  · Your pain gets worse.  · You cannot stand on your foot.  Get help right away if:  · Your foot is numb or tingling.  · Your foot or toes are swollen.  · Your foot or toes turn white or blue.  · You have warmth and redness along your foot.  Summary  · Common causes of foot pain are injury, sprain, arthritis, blisters or bunions.  · Ice, medicines, and comfortable shoes may help foot pain.  · Contact your health care provider if your pain does not get better after a few days of self-care.  This information is not intended to replace advice given to you by your health  care provider. Make sure you discuss any questions you have with your health care provider.  Document Revised: 10/03/2019 Document Reviewed: 10/03/2019  Elsevier Patient Education © 2021 [employer]

## 2021-03-25 NOTE — PROGRESS NOTES
Chief Complaint  Joint Swelling (Rt foot and ankle)    Subjective          Jef Mcarthur presents to [facility] for     History of Present Illness    Patient states he had some swelling in his feet and thinks that maybe it was because he had too much sodium.  He has been cutting back on the sodium.  He has been drinking a lot of water.  He states his swelling is improved significantly.    He has right foot pain that hurts while he is sitting resting or when he is walking.  He states he saw a podiatrist in the past for cyst on his foot.  He states he was given some medicine for his foot.  He started having a lot more pain in his foot and was advised to soak his foot by someone who called him from the office.  He states the podiatrist later told him that he actually needed to keep his foot dry.    He states he is also been feeling weak in his legs and having a little difficulty walking.  He uses a cane for stability, especially when he is walking long distances.  He states he has some instability when he walks.    Health Maintenance Due   Topic Date Due   • COVID-19 Vaccine (1) Never done   • ZOSTER VACCINE (1 of 2) Never done   • HEPATITIS C SCREENING  Never done        reports that he quit smoking about 42 years ago. His smoking use included cigarettes. He started smoking about 60 years ago. He has a 8.50 pack-year smoking history. He has never used smokeless tobacco. He reports current alcohol use. He reports that he does not use drugs.    PHQ-9 Depression Screening  Little interest or pleasure in doing things?     Feeling down, depressed, or hopeless?     Trouble falling or staying asleep, or sleeping too much?     Feeling tired or having little energy?     Poor appetite or overeating?     Feeling bad about yourself - or that you are a failure or have let yourself or your family down?     Trouble concentrating on things, such as reading the newspaper or watching television?      Moving or speaking so slowly that other people could have noticed? Or the opposite - being so fidgety or restless that you have been moving around a lot more than usual?     Thoughts that you would be better off dead, or of hurting yourself in some way?     PHQ-9 Total Score     If you checked off any problems, how difficult have these problems made it for you to do your work, take care of things at home, or get along with other people?       Lab Results   Component Value Date    WBC 6.98 02/24/2021    HGB 14.1 02/24/2021    HCT 42.7 02/24/2021    MCV 90.5 02/24/2021     02/24/2021     Lab Results   Component Value Date    GLUCOSE 113 (H) 07/06/2018    BUN 19 02/24/2021    CREATININE 0.88 02/24/2021    EGFRIFNONA 84 02/24/2021    EGFRIFAFRI 101 02/24/2021    BCR 21.6 02/24/2021    K 4.3 02/24/2021    CO2 23.1 02/24/2021    CALCIUM 9.3 02/24/2021    PROTENTOTREF 6.5 02/24/2021    ALBUMIN 4.40 02/24/2021    LABIL2 2.1 02/24/2021    AST 22 02/24/2021    ALT 26 02/24/2021     Lab Results   Component Value Date    TSH 2.790 02/24/2021     Lab Results   Component Value Date    HGBA1C 6.20 (H) 07/06/2018     Brief Urine Lab Results  (Last result in the past 365 days)      Color   Clarity   Blood   Leuk Est   Nitrite   Protein   CREAT   Urine HCG        02/24/21 1206 Yellow Clear Negative Negative Negative Negative               BP Readings from Last 12 Encounters:   03/25/21 134/78   03/18/21 114/68   03/06/21 154/79   03/02/21 142/78   02/18/21 128/64   07/06/18 142/84   03/28/18 169/78   03/02/18 124/58   02/28/18 164/68   02/23/18 138/72   01/17/18 164/78   01/11/18 149/89       Wt Readings from Last 12 Encounters:   03/25/21 99.3 kg (219 lb)   03/18/21 99.3 kg (219 lb)   03/14/21 101 kg (222 lb)   03/06/21 102 kg (224 lb 11.2 oz)   03/02/21 108 kg (237 lb)   02/18/21 108 kg (239 lb)   07/06/18 101 kg (223 lb)   03/28/18 101 kg (221 lb 9 oz)   03/02/18 102 kg (224 lb 9.6 oz)   02/28/18 102 kg (224 lb 3.3 oz)  "  02/23/18 102 kg (225 lb)   01/17/18 102 kg (224 lb 13.9 oz)       Objective   Vital Signs:   /78   Pulse 79   Temp 97.6 °F (36.4 °C)   Ht 172.7 cm (68\")   Wt 99.3 kg (219 lb)   SpO2 99%   BMI 33.30 kg/m²     Physical Exam  Vitals and nursing note reviewed.   Constitutional:       General: He is not in acute distress.     Appearance: Normal appearance. He is well-developed. He is not diaphoretic.   HENT:      Head: Normocephalic and atraumatic.      Right Ear: External ear normal.      Left Ear: External ear normal.      Nose: Nose normal.      Mouth/Throat:      Pharynx: No oropharyngeal exudate.   Eyes:      General: Lids are normal. No scleral icterus.        Right eye: No discharge.         Left eye: No discharge.   Neck:      Thyroid: No thyromegaly.      Trachea: Trachea normal. No tracheal deviation.   Cardiovascular:      Rate and Rhythm: Normal rate and regular rhythm.      Heart sounds: Normal heart sounds. No murmur heard.   No friction rub. No gallop.    Pulmonary:      Effort: Pulmonary effort is normal. No tachypnea, bradypnea or respiratory distress.      Breath sounds: Normal breath sounds. No stridor. No decreased breath sounds, wheezing or rales.   Chest:      Chest wall: No tenderness.   Musculoskeletal:      Cervical back: Full passive range of motion without pain, normal range of motion and neck supple. No edema.   Lymphadenopathy:      Head:      Right side of head: No submental, submandibular, tonsillar, preauricular, posterior auricular or occipital adenopathy.      Left side of head: No submental, submandibular, tonsillar, preauricular, posterior auricular or occipital adenopathy.      Cervical: No cervical adenopathy.      Right cervical: No superficial, deep or posterior cervical adenopathy.     Left cervical: No superficial, deep or posterior cervical adenopathy.   Skin:     General: Skin is warm and dry.      Capillary Refill: Capillary refill takes less than 2 seconds.      " Coloration: Skin is not pale.      Findings: No erythema or rash.      Nails: There is no clubbing.   Neurological:      Mental Status: He is alert and oriented to person, place, and time. He is not disoriented.      Deep Tendon Reflexes: Reflexes are normal and symmetric.   Psychiatric:         Behavior: Behavior normal.        Result Review :                 Assessment and Plan    Problem List Items Addressed This Visit     Right foot pain - Primary    Overview     3/25/2021: Patient states he had some swelling in his feet and thinks that maybe it was because he had too much sodium.  He has been cutting back on the sodium.  He has been drinking a lot of water.  He states his swelling is improved significantly.    He has right foot pain that hurts while he is sitting resting or when he is walking.  He states he saw a podiatrist in the past for cyst on his foot.  He states he was given some medicine for his foot.  He started having a lot more pain in his foot and was advised to soak his foot by someone who called him from the office.  He states the podiatrist later told him that he actually needed to keep his foot dry.  Refer to Dr. Bernard, podiatry.  X-ray of the right foot.         Relevant Orders    Ambulatory Referral to Podiatry (Completed)    XR Foot 3+ View Right    Weakness    Overview     3/25/2021: He states he is also been feeling weak in his legs and having a little difficulty walking.   He uses a cane for stability, especially when he is walking long distances.  He states he has some instability when he walks. Refer to physical therapy.           Relevant Orders    Ambulatory Referral to Physical Therapy Evaluate and treat      Other Visit Diagnoses     Abnormal gait        Relevant Orders    Ambulatory Referral to Physical Therapy Evaluate and treat          Follow Up   No follow-ups on file.  Patient was given instructions and counseling regarding his condition or for health maintenance advice.  Please see specific information pulled into the AVS if appropriate.

## 2021-03-30 ENCOUNTER — APPOINTMENT (OUTPATIENT)
Dept: PHYSICAL THERAPY | Facility: HOSPITAL | Age: 81
End: 2021-03-30

## 2021-04-13 ENCOUNTER — IMMUNIZATION (OUTPATIENT)
Dept: VACCINE CLINIC | Facility: HOSPITAL | Age: 81
End: 2021-04-13

## 2021-04-13 ENCOUNTER — OFFICE VISIT (OUTPATIENT)
Dept: FAMILY MEDICINE CLINIC | Facility: CLINIC | Age: 81
End: 2021-04-13

## 2021-04-13 VITALS
BODY MASS INDEX: 33.65 KG/M2 | SYSTOLIC BLOOD PRESSURE: 144 MMHG | HEART RATE: 76 BPM | RESPIRATION RATE: 16 BRPM | OXYGEN SATURATION: 98 % | DIASTOLIC BLOOD PRESSURE: 80 MMHG | TEMPERATURE: 98 F | WEIGHT: 222 LBS | HEIGHT: 68 IN

## 2021-04-13 DIAGNOSIS — R21 RASH: ICD-10-CM

## 2021-04-13 DIAGNOSIS — M62.838 MUSCLE SPASM: Primary | ICD-10-CM

## 2021-04-13 DIAGNOSIS — R53.1 WEAKNESS: ICD-10-CM

## 2021-04-13 PROBLEM — M54.6 ACUTE RIGHT-SIDED THORACIC BACK PAIN: Status: ACTIVE | Noted: 2021-04-13

## 2021-04-13 PROCEDURE — 0001A: CPT | Performed by: OBSTETRICS & GYNECOLOGY

## 2021-04-13 PROCEDURE — 91300 HC SARSCOV02 VAC 30MCG/0.3ML IM: CPT | Performed by: OBSTETRICS & GYNECOLOGY

## 2021-04-13 PROCEDURE — 99214 OFFICE O/P EST MOD 30 MIN: CPT | Performed by: FAMILY MEDICINE

## 2021-04-13 RX ORDER — BACLOFEN 10 MG/1
10 TABLET ORAL 3 TIMES DAILY PRN
Qty: 90 TABLET | Refills: 0 | Status: SHIPPED | OUTPATIENT
Start: 2021-04-13

## 2021-04-13 NOTE — PROGRESS NOTES
"Chief Complaint  Back Pain (back spasms x 3 days- NKI )    Subjective          Jef Mcarthur presents to [facility]  History of Present Illness    Pt states he was getting out of bed at 3:30 am and developed a back spasm in his right mid-back. It got better on Sunday. The pain had gotten up to a 10/10. He rates it as a 2/10 now.  He took some Baclofen this morning that he had from a previous shoulder problem, which he states helped.    He saw the podiatrist and his recommendations are helping his foot pain.    He got his first COVID-19 vaccine today. He is scheduled to receive the second dose in 3 weeks.    He states when he felt tired and sick a few weeks ago he figured out that it was because he inhaled Clorox while cleaning his bathroom.  He has a rash on his hands that is getting better.    Objective   Vital Signs:   /80   Pulse 76   Temp 98 °F (36.7 °C)   Resp 16   Ht 172.7 cm (68\")   Wt 101 kg (222 lb)   SpO2 98%   BMI 33.75 kg/m²     Physical Exam  Vitals and nursing note reviewed.   Constitutional:       General: He is not in acute distress.     Appearance: Normal appearance. He is well-developed. He is not diaphoretic.   HENT:      Head: Normocephalic and atraumatic.      Right Ear: External ear normal.      Left Ear: External ear normal.      Nose: Nose normal.      Mouth/Throat:      Pharynx: No oropharyngeal exudate.   Eyes:      General: Lids are normal. No scleral icterus.        Right eye: No discharge.         Left eye: No discharge.   Neck:      Thyroid: No thyromegaly.      Trachea: Trachea normal. No tracheal deviation.   Cardiovascular:      Rate and Rhythm: Normal rate and regular rhythm.      Heart sounds: Normal heart sounds. No murmur heard.   No friction rub. No gallop.    Pulmonary:      Effort: Pulmonary effort is normal. No tachypnea, bradypnea or respiratory distress.      Breath sounds: Normal breath sounds. No stridor. No decreased breath " sounds, wheezing or rales.   Chest:      Chest wall: No tenderness.   Musculoskeletal:      Cervical back: Full passive range of motion without pain, normal range of motion and neck supple. No edema.   Lymphadenopathy:      Head:      Right side of head: No submental, submandibular, tonsillar, preauricular, posterior auricular or occipital adenopathy.      Left side of head: No submental, submandibular, tonsillar, preauricular, posterior auricular or occipital adenopathy.      Cervical: No cervical adenopathy.      Right cervical: No superficial, deep or posterior cervical adenopathy.     Left cervical: No superficial, deep or posterior cervical adenopathy.   Skin:     General: Skin is warm and dry.      Capillary Refill: Capillary refill takes less than 2 seconds.      Coloration: Skin is not pale.      Findings: No erythema or rash.      Nails: There is no clubbing.   Neurological:      Mental Status: He is alert and oriented to person, place, and time. He is not disoriented.      Deep Tendon Reflexes: Reflexes are normal and symmetric.   Psychiatric:         Behavior: Behavior normal.        Result Review :                 Assessment and Plan    Diagnoses and all orders for this visit:    1. Muscle spasm (Primary)  -     baclofen (LIORESAL) 10 MG tablet; Take 1 tablet by mouth 3 (Three) Times a Day As Needed for Muscle Spasms.  Dispense: 90 tablet; Refill: 0    2. Weakness  Assessment & Plan:  Monitor for improvement.      3. Rash  Assessment & Plan:   Improving. Monitor for improvement.    Orders:  -     triamcinolone (KENALOG) 0.1 % ointment; Apply  topically to the appropriate area as directed 2 (Two) Times a Day As Needed for Irritation or Rash.  Dispense: 80 g; Refill: 1      Follow Up   No follow-ups on file.  Patient was given instructions and counseling regarding his condition or for health maintenance advice. Please see specific information pulled into the AVS if appropriate.

## 2021-04-13 NOTE — PATIENT INSTRUCTIONS
Muscle Cramps and Spasms  Muscle cramps and spasms are when muscles tighten by themselves. They usually get better within minutes. Muscle cramps are painful. They are usually stronger and last longer than muscle spasms. Muscle spasms may or may not be painful. They can last a few seconds or much longer.  Cramps and spasms can affect any muscle, but they occur most often in the calf muscles of the leg. They are usually not caused by a serious problem. In many cases, the cause is not known. Some common causes include:  · Doing more physical work or exercise than your body is ready for.  · Using the muscles too much (overuse) by repeating certain movements too many times.  · Staying in a certain position for a long time.  · Playing a sport or doing an activity without preparing properly.  · Using bad form or technique while playing a sport or doing an activity.  · Not having enough water in your body (dehydration).  · Injury.  · Side effects of some medicines.  · Low levels of the salts and minerals in your blood (electrolytes), such as low potassium or calcium.  Follow these instructions at home:  Managing pain and stiffness         · Massage, stretch, and relax the muscle. Do this for many minutes at a time.  · If told, put heat on tight or tense muscles as often as told by your doctor. Use the heat source that your doctor recommends, such as a moist heat pack or a heating pad.  ? Place a towel between your skin and the heat source.  ? Leave the heat on for 20-30 minutes.  ? Remove the heat if your skin turns bright red. This is very important if you are not able to feel pain, heat, or cold. You may have a greater risk of getting burned.  · If told, put ice on the affected area. This may help if you are sore or have pain after a cramp or spasm.  ? Put ice in a plastic bag.  ? Place a towel between your skin and the bag.  ? Leave the ice on for 20 minutes, 2-3 times a day.  · Try taking hot showers or baths to help  relax tight muscles.  Eating and drinking  · Drink enough fluid to keep your pee (urine) pale yellow.  · Eat a healthy diet to help ensure that your muscles work well. This should include:  ? Fruits and vegetables.  ? Lean protein.  ? Whole grains.  ? Low-fat or nonfat dairy products.  General instructions  · If you are having cramps often, avoid intense exercise for several days.  · Take over-the-counter and prescription medicines only as told by your doctor.  · Watch for any changes in your symptoms.  · Keep all follow-up visits as told by your doctor. This is important.  Contact a doctor if:  · Your cramps or spasms get worse or happen more often.  · Your cramps or spasms do not get better with time.  Summary  · Muscle cramps and spasms are when muscles tighten by themselves. They usually get better within minutes.  · Cramps and spasms occur most often in the calf muscles of the leg.  · Massage, stretch, and relax the muscle. This may help the cramp or spasm go away.  · Drink enough fluid to keep your pee (urine) pale yellow.  This information is not intended to replace advice given to you by your health care provider. Make sure you discuss any questions you have with your health care provider.  Document Revised: 05/13/2019 Document Reviewed: 05/13/2019  [employer] Patient Education © 2021 [employer]

## 2021-05-04 ENCOUNTER — IMMUNIZATION (OUTPATIENT)
Dept: VACCINE CLINIC | Facility: HOSPITAL | Age: 81
End: 2021-05-04

## 2021-05-04 PROCEDURE — 0002A: CPT | Performed by: OBSTETRICS & GYNECOLOGY

## 2021-05-04 PROCEDURE — 91300 HC SARSCOV02 VAC 30MCG/0.3ML IM: CPT | Performed by: OBSTETRICS & GYNECOLOGY

## 2021-10-12 DIAGNOSIS — E78.2 MIXED HYPERLIPIDEMIA: ICD-10-CM

## 2021-10-12 RX ORDER — SIMVASTATIN 20 MG
TABLET ORAL
Qty: 90 TABLET | Refills: 1 | OUTPATIENT
Start: 2021-10-12

## 2022-09-26 ENCOUNTER — APPOINTMENT (OUTPATIENT)
Dept: GENERAL RADIOLOGY | Facility: HOSPITAL | Age: 82
End: 2022-09-26

## 2022-09-26 ENCOUNTER — APPOINTMENT (OUTPATIENT)
Dept: CT IMAGING | Facility: HOSPITAL | Age: 82
End: 2022-09-26

## 2022-09-26 ENCOUNTER — HOSPITAL ENCOUNTER (EMERGENCY)
Facility: HOSPITAL | Age: 82
Discharge: HOME | End: 2022-09-26
Attending: EMERGENCY MEDICINE | Admitting: EMERGENCY MEDICINE

## 2022-09-26 VITALS
HEIGHT: 68 IN | HEART RATE: 78 BPM | SYSTOLIC BLOOD PRESSURE: 135 MMHG | BODY MASS INDEX: 33.95 KG/M2 | TEMPERATURE: 98.1 F | WEIGHT: 224 LBS | DIASTOLIC BLOOD PRESSURE: 71 MMHG | OXYGEN SATURATION: 93 % | RESPIRATION RATE: 19 BRPM

## 2022-09-26 DIAGNOSIS — J90 PLEURAL EFFUSION: ICD-10-CM

## 2022-09-26 DIAGNOSIS — I51.7 CARDIOMEGALY: Primary | ICD-10-CM

## 2022-09-26 DIAGNOSIS — J81.0 ACUTE PULMONARY EDEMA: ICD-10-CM

## 2022-09-26 LAB
ALBUMIN SERPL-MCNC: 4.2 G/DL (ref 3.5–5.2)
ALBUMIN/GLOB SERPL: 1.9 G/DL
ALP SERPL-CCNC: 51 U/L (ref 39–117)
ALT SERPL W P-5'-P-CCNC: 15 U/L (ref 1–41)
ANION GAP SERPL CALCULATED.3IONS-SCNC: 12.4 MMOL/L (ref 5–15)
AST SERPL-CCNC: 15 U/L (ref 1–40)
BASOPHILS # BLD AUTO: 0.07 10*3/MM3 (ref 0–0.2)
BASOPHILS NFR BLD AUTO: 0.9 % (ref 0–1.5)
BILIRUB SERPL-MCNC: 0.6 MG/DL (ref 0–1.2)
BUN SERPL-MCNC: 17 MG/DL (ref 8–23)
BUN/CREAT SERPL: 18.7 (ref 7–25)
CALCIUM SPEC-SCNC: 9 MG/DL (ref 8.6–10.5)
CHLORIDE SERPL-SCNC: 96 MMOL/L (ref 98–107)
CO2 SERPL-SCNC: 21.6 MMOL/L (ref 22–29)
CREAT SERPL-MCNC: 0.91 MG/DL (ref 0.76–1.27)
D DIMER PPP FEU-MCNC: 0.48 MCGFEU/ML (ref 0–0.46)
DEPRECATED RDW RBC AUTO: 42.6 FL (ref 37–54)
EGFRCR SERPLBLD CKD-EPI 2021: 84.7 ML/MIN/1.73
EOSINOPHIL # BLD AUTO: 0.19 10*3/MM3 (ref 0–0.4)
EOSINOPHIL NFR BLD AUTO: 2.4 % (ref 0.3–6.2)
ERYTHROCYTE [DISTWIDTH] IN BLOOD BY AUTOMATED COUNT: 13.2 % (ref 12.3–15.4)
GLOBULIN UR ELPH-MCNC: 2.2 GM/DL
GLUCOSE SERPL-MCNC: 118 MG/DL (ref 65–99)
HCT VFR BLD AUTO: 38.8 % (ref 37.5–51)
HGB BLD-MCNC: 13.5 G/DL (ref 13–17.7)
HOLD SPECIMEN: NORMAL
HOLD SPECIMEN: NORMAL
IMM GRANULOCYTES # BLD AUTO: 0.02 10*3/MM3 (ref 0–0.05)
IMM GRANULOCYTES NFR BLD AUTO: 0.2 % (ref 0–0.5)
LYMPHOCYTES # BLD AUTO: 2.74 10*3/MM3 (ref 0.7–3.1)
LYMPHOCYTES NFR BLD AUTO: 33.9 % (ref 19.6–45.3)
MCH RBC QN AUTO: 31 PG (ref 26.6–33)
MCHC RBC AUTO-ENTMCNC: 34.8 G/DL (ref 31.5–35.7)
MCV RBC AUTO: 89 FL (ref 79–97)
MONOCYTES # BLD AUTO: 0.56 10*3/MM3 (ref 0.1–0.9)
MONOCYTES NFR BLD AUTO: 6.9 % (ref 5–12)
NEUTROPHILS NFR BLD AUTO: 4.5 10*3/MM3 (ref 1.7–7)
NEUTROPHILS NFR BLD AUTO: 55.7 % (ref 42.7–76)
NRBC BLD AUTO-RTO: 0 /100 WBC (ref 0–0.2)
NT-PROBNP SERPL-MCNC: 723.6 PG/ML (ref 0–1800)
PLATELET # BLD AUTO: 291 10*3/MM3 (ref 140–450)
PMV BLD AUTO: 10.2 FL (ref 6–12)
POTASSIUM SERPL-SCNC: 3.5 MMOL/L (ref 3.5–5.2)
PROT SERPL-MCNC: 6.4 G/DL (ref 6–8.5)
QT INTERVAL: 413 MS
QT INTERVAL: 417 MS
RBC # BLD AUTO: 4.36 10*6/MM3 (ref 4.14–5.8)
SODIUM SERPL-SCNC: 130 MMOL/L (ref 136–145)
TROPONIN T SERPL-MCNC: <0.01 NG/ML (ref 0–0.03)
TROPONIN T SERPL-MCNC: <0.01 NG/ML (ref 0–0.03)
WBC NRBC COR # BLD: 8.08 10*3/MM3 (ref 3.4–10.8)
WHOLE BLOOD HOLD COAG: NORMAL
WHOLE BLOOD HOLD SPECIMEN: NORMAL

## 2022-09-26 PROCEDURE — 71275 CT ANGIOGRAPHY CHEST: CPT

## 2022-09-26 PROCEDURE — 25010000002 FUROSEMIDE PER 20 MG

## 2022-09-26 PROCEDURE — 93005 ELECTROCARDIOGRAM TRACING: CPT

## 2022-09-26 PROCEDURE — 99284 EMERGENCY DEPT VISIT MOD MDM: CPT

## 2022-09-26 PROCEDURE — 84484 ASSAY OF TROPONIN QUANT: CPT

## 2022-09-26 PROCEDURE — 0 IOPAMIDOL PER 1 ML: Performed by: EMERGENCY MEDICINE

## 2022-09-26 PROCEDURE — 71046 X-RAY EXAM CHEST 2 VIEWS: CPT

## 2022-09-26 PROCEDURE — 83880 ASSAY OF NATRIURETIC PEPTIDE: CPT

## 2022-09-26 PROCEDURE — 85379 FIBRIN DEGRADATION QUANT: CPT

## 2022-09-26 PROCEDURE — 93010 ELECTROCARDIOGRAM REPORT: CPT | Performed by: INTERNAL MEDICINE

## 2022-09-26 PROCEDURE — 85025 COMPLETE CBC W/AUTO DIFF WBC: CPT

## 2022-09-26 PROCEDURE — 96374 THER/PROPH/DIAG INJ IV PUSH: CPT

## 2022-09-26 PROCEDURE — 36415 COLL VENOUS BLD VENIPUNCTURE: CPT

## 2022-09-26 PROCEDURE — 80053 COMPREHEN METABOLIC PANEL: CPT

## 2022-09-26 RX ORDER — FUROSEMIDE 10 MG/ML
40 INJECTION INTRAMUSCULAR; INTRAVENOUS ONCE
Status: COMPLETED | OUTPATIENT
Start: 2022-09-26 | End: 2022-09-26

## 2022-09-26 RX ORDER — SODIUM CHLORIDE 0.9 % (FLUSH) 0.9 %
10 SYRINGE (ML) INJECTION AS NEEDED
Status: DISCONTINUED | OUTPATIENT
Start: 2022-09-26 | End: 2022-09-26 | Stop reason: HOSPADM

## 2022-09-26 RX ORDER — POTASSIUM CHLORIDE 20 MEQ/1
20 TABLET, EXTENDED RELEASE ORAL ONCE
Status: COMPLETED | OUTPATIENT
Start: 2022-09-26 | End: 2022-09-26

## 2022-09-26 RX ORDER — SODIUM CHLORIDE 9 MG/ML
INJECTION, SOLUTION INTRAVENOUS
Status: COMPLETED
Start: 2022-09-26 | End: 2022-09-26

## 2022-09-26 RX ADMIN — FUROSEMIDE 40 MG: 10 INJECTION, SOLUTION INTRAMUSCULAR; INTRAVENOUS at 18:13

## 2022-09-26 RX ADMIN — IOPAMIDOL 85 ML: 755 INJECTION, SOLUTION INTRAVENOUS at 16:11

## 2022-09-26 RX ADMIN — POTASSIUM CHLORIDE 20 MEQ: 1500 TABLET, EXTENDED RELEASE ORAL at 18:14

## 2022-09-26 RX ADMIN — SODIUM CHLORIDE 500 ML: 9 INJECTION, SOLUTION INTRAVENOUS at 16:04

## 2022-09-26 NOTE — ED PROVIDER NOTES
EMERGENCY DEPARTMENT ENCOUNTER      Room Number: 08/08    History is provided by the patient, no translation services needed    HPI:    Chief complaint: Chest pressure    Location: Center of chest    Quality/Severity: Patient denies any chest pain or chest pressure at this time.    Timing/Duration: Since 1 AM    Modifying Factors: Patient received 324 of aspirin and 1 nitroglycerin by EMS prior to arrival which helped the chest pressure.    Associated Symptoms: Shortness of breath x1 week    Narrative: Pt is a 82 y.o. male who presents complaining of chest pressure since 1 AM.  Patient also complains of shortness of breath x1 week.  He advises that approximately 2 weeks ago [facility]  The patient was given diuretics for 7 days which improved his CHF symptoms.  He states that once the diuretics were stopped he began having shortness of breath.  Patient is unsure if the shortness of breath is secondary to anxiety.  He advises that he was very anxious when attempting to sleep last night and woke up at 1 AM with chest pressure.  [facility] who [facility] be transported via EMS to the closest facility.  EMS gave the patient 324 of aspirin and 1 nitroglycerin prior to arrival to the ED.  Patient denies any chest pressure at this time.  He states that his shortness of breath is still present.  He denies any leg swelling, diaphoresis, nausea, vomiting, abdominal pain, cough, headaches, vision changes, or dizziness.  Patient denies any neck or back pain.      PMD: Wilson Lorenzo Jr., DO    REVIEW OF SYSTEMS  Review of Systems   Constitutional: Negative for chills, diaphoresis, fatigue and fever.   HENT: Negative for congestion and rhinorrhea.    Eyes: Negative for visual disturbance.   Respiratory: Positive for shortness of breath (x1 week). Negative for cough.    Cardiovascular: Positive for chest pain (Chest pressure since 1 am). Negative for palpitations and leg swelling.    Gastrointestinal: Negative for abdominal pain, blood in stool, constipation, diarrhea, nausea and vomiting.   Genitourinary: Negative for difficulty urinating, dysuria and flank pain.   Musculoskeletal: Negative for back pain, gait problem, myalgias and neck pain.   Skin: Negative for color change, pallor, rash and wound.   Neurological: Negative for dizziness, syncope, weakness, numbness and headaches.   Psychiatric/Behavioral: Negative for confusion. The patient is not nervous/anxious.          PAST MEDICAL HISTORY  Active Ambulatory Problems     Diagnosis Date Noted   • Coronary arteriosclerosis in native artery 06/01/2016   • Mixed hyperlipidemia 06/01/2016   • Essential hypertension 06/01/2016   • Chronic midline low back pain with right-sided sciatica 06/01/2016   • Chronic left shoulder pain 11/17/2017   • Class 1 obesity due to excess calories without serious comorbidity with body mass index (BMI) of 34.0 to 34.9 in adult 11/17/2017   • Allergic rhinitis 10/18/2017   • Deviated nasal septum 10/18/2017   • Hearing loss 10/18/2017   • Skin lesion of left ear 02/18/2021   • Gastroesophageal reflux disease without esophagitis 02/18/2021   • Functional gait abnormality 02/18/2021   • RAFAEL (obstructive sleep apnea) 02/18/2021   • IFG (impaired fasting glucose) 02/25/2021   • Right foot pain 03/25/2021   • Weakness 03/25/2021   • Acute right-sided thoracic back pain 04/13/2021   • Rash 04/13/2021     Resolved Ambulatory Problems     Diagnosis Date Noted   • Ischemic heart disease      Past Medical History:   Diagnosis Date   • Anxiety    • Colon polyps    • Hyperlipidemia    • Hypertension    • Tobacco use        PAST SURGICAL HISTORY  Past Surgical History:   Procedure Laterality Date   • CORONARY ANGIOPLASTY WITH STENT PLACEMENT Left 07/16/2010    70% RCA stenosis treated with 3.0 x 18 mm Cordis Cypher IRENA; LVEF 78%.    • TONSILLECTOMY         FAMILY HISTORY  Family History   Problem Relation Age of Onset   •  Osteoarthritis Mother    • Stroke Mother        SOCIAL HISTORY  Social History     Socioeconomic History   • Marital status:    Tobacco Use   • Smoking status: Former Smoker     Packs/day: 0.50     Years: 17.00     Pack years: 8.50     Types: Cigarettes     Start date:      Quit date: 1978     Years since quittin.3   • Smokeless tobacco: Never Used   Substance and Sexual Activity   • Alcohol use: Yes     Comment: rare   • Drug use: No   • Sexual activity: Defer       ALLERGIES  Pneumococcal vac polyvalent      Current Facility-Administered Medications:   •  sodium chloride 0.9 % flush 10 mL, 10 mL, Intravenous, PRN, Emergency, Triage Protocol, MD  •  [COMPLETED] Insert peripheral IV, , , Once **AND** sodium chloride 0.9 % flush 10 mL, 10 mL, Intravenous, PRN, Bel Hogan PA-C    Current Outpatient Medications:   •  acetaminophen (TYLENOL) 500 MG tablet, Take 1,000 mg by mouth Daily., Disp: , Rfl:   •  aspirin 81 MG EC tablet, Take 81 mg by mouth daily., Disp: , Rfl:   •  baclofen (LIORESAL) 10 MG tablet, Take 1 tablet by mouth 3 (Three) Times a Day As Needed for Muscle Spasms., Disp: 90 tablet, Rfl: 0  •  hydroCHLOROthiazide (HYDRODIURIL) 25 MG tablet, Take 1 tablet by mouth Daily., Disp: 90 tablet, Rfl: 1  •  losartan (Cozaar) 100 MG tablet, Take 1 tablet by mouth Daily., Disp: 90 tablet, Rfl: 3  •  metoprolol succinate XL (TOPROL-XL) 25 MG 24 hr tablet, Take 1 tablet by mouth Daily., Disp: 90 tablet, Rfl: 1  •  omeprazole (PrilOSEC) 20 MG capsule, Take 1 capsule by mouth Every Other Day As Needed (GERD)., Disp: 45 capsule, Rfl: 1  •  sertraline (ZOLOFT) 50 MG tablet, , Disp: , Rfl:   •  simvastatin (ZOCOR) 20 MG tablet, Take 1 tablet by mouth Daily., Disp: 90 tablet, Rfl: 1  •  triamcinolone (KENALOG) 0.1 % ointment, Apply  topically to the appropriate area as directed 2 (Two) Times a Day As Needed for Irritation or Rash., Disp: 80 g, Rfl: 1    PHYSICAL EXAM  ED Triage Vitals [22  1335]   Temp Heart Rate Resp BP SpO2   98.1 °F (36.7 °C) 87 20 146/76 93 %      Temp src Heart Rate Source Patient Position BP Location FiO2 (%)   Oral Monitor Lying Right arm --       Physical Exam  Vitals and nursing note reviewed.   Constitutional:       General: He is not in acute distress.     Appearance: He is well-developed. He is not ill-appearing, toxic-appearing or diaphoretic.   HENT:      Head: Normocephalic and atraumatic.   Eyes:      Extraocular Movements: Extraocular movements intact.      Conjunctiva/sclera: Conjunctivae normal.      Pupils: Pupils are equal, round, and reactive to light.   Neck:      Vascular: No hepatojugular reflux or JVD.      Trachea: No tracheal deviation.   Cardiovascular:      Rate and Rhythm: Normal rate and regular rhythm.      Heart sounds: Normal heart sounds.     No friction rub.   Pulmonary:      Effort: Pulmonary effort is normal. No tachypnea, accessory muscle usage or respiratory distress.      Breath sounds: Normal breath sounds. No stridor. No decreased breath sounds, wheezing, rhonchi or rales.   Chest:      Chest wall: No mass, deformity, tenderness, crepitus or edema.   Abdominal:      General: Bowel sounds are normal. There is no distension.      Palpations: Abdomen is soft. There is no mass.      Tenderness: There is no abdominal tenderness. There is no guarding or rebound.   Musculoskeletal:         General: Normal range of motion.      Cervical back: Normal range of motion and neck supple.      Right lower leg: No tenderness. No edema.      Left lower leg: No tenderness. No edema.   Skin:     General: Skin is warm and dry.      Coloration: Skin is not cyanotic or pale.      Findings: No ecchymosis, erythema or rash.      Nails: There is no clubbing.   Neurological:      Mental Status: He is alert and oriented to person, place, and time.   Psychiatric:         Mood and Affect: Mood and affect normal.           LAB RESULTS  Lab Results (last 24 hours)      Procedure Component Value Units Date/Time    CBC & Differential [[id number]]  (Normal) Collected: 09/26/22 1348    Specimen: Blood Updated: 09/26/22 1411    Narrative:      The following orders were created for panel order CBC & Differential.  Procedure                               Abnormality         Status                     ---------                               -----------         ------                     CBC Auto Differential[[id number]]        Normal              Final result                 Please view results for these tests on the individual orders.    Comprehensive Metabolic Panel [[id number]]  (Abnormal) Collected: 09/26/22 1348    Specimen: Blood Updated: 09/26/22 1434     Glucose 118 mg/dL      BUN 17 mg/dL      Creatinine 0.91 mg/dL      Sodium 130 mmol/L      Potassium 3.5 mmol/L      Chloride 96 mmol/L      CO2 21.6 mmol/L      Calcium 9.0 mg/dL      Total Protein 6.4 g/dL      Albumin 4.20 g/dL      ALT (SGPT) 15 U/L      AST (SGOT) 15 U/L      Alkaline Phosphatase 51 U/L      Total Bilirubin 0.6 mg/dL      Globulin 2.2 gm/dL      A/G Ratio 1.9 g/dL      BUN/Creatinine Ratio 18.7     Anion Gap 12.4 mmol/L      eGFR 84.7 mL/min/1.73      Comment: National Kidney Foundation and American Society of Nephrology (ASN) Task Force recommended calculation based on the Chronic Kidney Disease Epidemiology Collaboration (CKD-EPI) equation refit without adjustment for race.       Narrative:      GFR Normal >60  Chronic Kidney Disease <60  Kidney Failure <15      Troponin [[id number]]  (Normal) Collected: 09/26/22 1348    Specimen: Blood Updated: 09/26/22 1436     Troponin T <0.010 ng/mL     Narrative:      Troponin T Reference Range:  <= 0.03 ng/mL-   Negative for AMI  >0.03 ng/mL-     Abnormal for myocardial necrosis.  Clinicians would have to utilize clinical acumen, EKG, Troponin and serial changes to determine if it is an Acute Myocardial Infarction or myocardial injury due to an underlying chronic  condition.       Results may be falsely decreased if patient taking Biotin.      CBC Auto Differential [[id number]]  (Normal) Collected: 09/26/22 1348    Specimen: Blood Updated: 09/26/22 1411     WBC 8.08 10*3/mm3      RBC 4.36 10*6/mm3      Hemoglobin 13.5 g/dL      Hematocrit 38.8 %      MCV 89.0 fL      MCH 31.0 pg      MCHC 34.8 g/dL      RDW 13.2 %      RDW-SD 42.6 fl      MPV 10.2 fL      Platelets 291 10*3/mm3      Neutrophil % 55.7 %      Lymphocyte % 33.9 %      Monocyte % 6.9 %      Eosinophil % 2.4 %      Basophil % 0.9 %      Immature Grans % 0.2 %      Neutrophils, Absolute 4.50 10*3/mm3      Lymphocytes, Absolute 2.74 10*3/mm3      Monocytes, Absolute 0.56 10*3/mm3      Eosinophils, Absolute 0.19 10*3/mm3      Basophils, Absolute 0.07 10*3/mm3      Immature Grans, Absolute 0.02 10*3/mm3      nRBC 0.0 /100 WBC     BNP [[id number]]  (Normal) Collected: 09/26/22 1348    Specimen: Blood Updated: 09/26/22 1455     proBNP 723.6 pg/mL     Narrative:      Among patients with dyspnea, NT-proBNP is highly sensitive for the detection of acute congestive heart failure. In addition NT-proBNP of <300 pg/ml effectively rules out acute congestive heart failure with 99% negative predictive value.    Results may be falsely decreased if patient taking Biotin.      D-dimer, Quantitative [[id number]]  (Abnormal) Collected: 09/26/22 1348    Specimen: Blood Updated: 09/26/22 1433     D-Dimer, Quantitative 0.48 MCGFEU/mL     Narrative:      Can be elevated in, but is not diagnostic for deep vein thrombosis (DVT) or pulmonary embolis (PE).  It is also elevated in other medical conditions.  Clinical correlation is required.  The negative cut-off value for the D-Dimer is 0.50 mcg FEU/mL for DVT and PE.      Troponin [[id number]]  (Normal) Collected: 09/26/22 1614    Specimen: Blood Updated: 09/26/22 1700     Troponin T <0.010 ng/mL     Narrative:      Troponin T Reference Range:  <= 0.03 ng/mL-   Negative for AMI  >0.03 ng/mL-      Abnormal for myocardial necrosis.  Clinicians would have to utilize clinical acumen, EKG, Troponin and serial changes to determine if it is an Acute Myocardial Infarction or myocardial injury due to an underlying chronic condition.       Results may be falsely decreased if patient taking Biotin.              I ordered the above labs and reviewed the results    RADIOLOGY  XR Chest 2 View    Result Date: 9/26/2022  CHEST X-RAY, 09/26/2022     HISTORY: 82-year-old male in the ED with shortness of breath.  TECHNIQUE: PA lateral upright chest series.  FINDINGS: Diffusely increased interstitial markings throughout both lungs, greatest in the lung bases, having a pattern suggesting interstitial edema, correlate clinically. Small pleural effusions with fluid visible in the pulmonary fissures. Heart size is normal.      Suspected diffuse interstitial edema with small pleural effusions. Cardiogenic or noncardiogenic pulmonary edema should be considered. Pneumonitis should be excluded clinically.  This report was finalized on 9/26/2022 2:43 PM by Dr. Monty Melo MD.      CT Angiogram Chest    Result Date: 9/26/2022  CTA Chest INDICATION: elevated dimer TECHNIQUE: CT angiogram of the chest with IV contrast. 3-D reconstructions were obtained and reviewed.   Radiation dose reduction techniques included automated exposure control or exposure modulation based on body size. Count of known CT and cardiac nuc med studies performed in previous 12 months: 0. COMPARISON: None. FINDINGS: Adequate opacification of the pulmonary arteries. No evidence of pulmonary embolism. Dilated pulmonary arterial trunk. Heart is mildly enlarged. Mild coronary artery calcifications. No pericardial effusion. Normal course and caliber of thoracic aorta. No evidence of aneurysm. No pulmonary consolidation. Small to moderate volume bilateral pleural effusions. Mild interlobular septal thickening in the lung bases with associated groundglass  opacification which may represent pulmonary edema. No pneumothorax. No acute osseous abnormality. No acute abnormality in the visualized upper abdomen. Small low density hepatic lesion, likely cyst. Small hiatal hernia.     1.  No evidence of pulmonary embolism. 2.  Mild cardiomegaly with small to moderate volume bilateral pleural effusions and pulmonary edema. 3.  Coronary artery calcifications. 4.  Dilated pulmonary artery show trunk which may reflect pulmonary arterial hypertension. Signer Name: Rocky Dolan MD  Signed: 9/26/2022 4:42 PM  Workstation Name: [id number]  [facility]      I ordered the above radiologic testing and reviewed the results    PROCEDURES  Procedures      PROGRESS AND CONSULTS  ED Course as of 09/26/22 1826   Mon Sep 26, 2022   1402 Patient is able to speak in 7-12 word sentences between breaths.  No acute respiratory distress noted.  No accessory muscle usage noted. [AH]   1402 EKG         EKG time / Interpretation time: 1340 / 1342  Rhythm/Rate: Sinus, 81   OR: 177  QRS, axis: 52  QTc 479  ST and T waves: No acute ST segment changes or T wave abnormalities.  EKG Tracing Interpreted Contemporaneously by me, independently viewed by me and MD.  No significant change compared to prior 04/15/2016.  [AH]   1639 EKG         EKG time / Interpretation time: 1610 / 1612  Rhythm/Rate: Sinus, 77   OR: 139  QRS, axis: 40  QTc 473  ST and T waves: No acute ST segment changes or T wave abnormalities.  EKG Tracing Interpreted Contemporaneously by me, independently viewed by me and MD.  Unchanged compared to prior from today.   [AH]   1707 The patient's work of breathing is within normal limits.  Oxygen saturation within normal limits.  Lung sounds are clear and equal bilaterally.  No signs of volume overload.  No edema in the lower extremities.  [facility] at this time. [AH]   1804 Pt's cardiologist was unable to be contacted. [facility] refused  to remark on the pt due to not being able to access his records. Pt states that he feels fine at this time. He advises that he has an appointment with his cardiologist tomorrow. Discussed the pt with Dr. Adkins who advised that 40 of Lasix and PO potassium with discharge and follow up would be appropriate for this patient who is in no distress. No respiratory distress, lung sounds CTA bilaterally, no edema noted upon exam, no distress. Pt states that he is comfortable being discharged home at this time.  []   1808 Pt is 94-95% on RA [AH]      ED Course User Index  [AH] Bel Hogan PA-C           MEDICAL DECISION MAKING    MDM     My differential diagnosis for chest pain includes but is not limited to:  Muscle strain, costochondritis, myositis, pleurisy, rib fracture, intercostal neuritis, herpes zoster, tumor, myocardial infarction, coronary syndrome, unstable angina, angina, aortic dissection, mitral valve prolapse, pericarditis, palpitations, pulmonary embolus, pneumonia, pneumothorax, lung cancer, GERD, esophagitis, esophageal spasm  DIAGNOSIS  Final diagnoses:   Cardiomegaly   Pleural effusion   Acute pulmonary edema (HCC)       Latest Documented Vital Signs:  As of 18:26 EDT  BP- 135/90 HR- 80 Temp- 98.1 °F (36.7 °C) (Oral) O2 sat- 96%    DISPOSITION  Pt discharged    Discussed pertinent findings with the patient/family.  Patient/Family voiced understanding of need to follow-up for recheck and further testing as needed.  Return to the Emergency Department warnings were given.         Medication List      No changes were made to your prescriptions during this visit.              Follow-up Information     Wilson Lorenzo Jr., DO. Call today.    Specialties: Family Medicine, Emergency Medicine, Urgent Care  Why: to schedule follow up  Contact information:  [place] [address]  [phone]             Go to  [facility].    Specialty: [facility]  Why: If  symptoms worsen  Contact information:  [address]  [facility] [id number] [phone]                         Dictated utilizing Dragon dictation     Bel Hogan PA-C  09/26/22 2515

## 2022-09-26 NOTE — DISCHARGE INSTRUCTIONS
Follow-up with your cardiologist at your scheduled appointment time of 4 PM tomorrow.  Follow-up with your PCP as indicated.  Return to the ED for worsening symptoms or medical emergencies.

## 2022-09-26 NOTE — ED NOTES
Called [facility] regarding faxing medical records.  Spoke with ALD department.

## 2022-09-29 ENCOUNTER — TRANSCRIBE ORDERS (OUTPATIENT)
Dept: ADMINISTRATIVE | Facility: HOSPITAL | Age: 82
End: 2022-09-29

## 2022-09-29 DIAGNOSIS — R01.1 UNDIAGNOSED CARDIAC MURMURS: Primary | ICD-10-CM

## 2022-10-06 ENCOUNTER — APPOINTMENT (OUTPATIENT)
Dept: CARDIOLOGY | Facility: HOSPITAL | Age: 82
End: 2022-10-06

## 2022-10-07 ENCOUNTER — APPOINTMENT (OUTPATIENT)
Dept: CARDIOLOGY | Facility: HOSPITAL | Age: 82
End: 2022-10-07

## 2023-03-15 ENCOUNTER — HOSPITAL ENCOUNTER (EMERGENCY)
Facility: HOSPITAL | Age: 83
Discharge: HOME | End: 2023-03-15
Attending: EMERGENCY MEDICINE | Admitting: EMERGENCY MEDICINE
Payer: OTHER GOVERNMENT

## 2023-03-15 ENCOUNTER — APPOINTMENT (OUTPATIENT)
Dept: GENERAL RADIOLOGY | Facility: HOSPITAL | Age: 83
End: 2023-03-15
Payer: OTHER GOVERNMENT

## 2023-03-15 VITALS
DIASTOLIC BLOOD PRESSURE: 72 MMHG | TEMPERATURE: 97.7 F | RESPIRATION RATE: 20 BRPM | BODY MASS INDEX: 28.68 KG/M2 | HEIGHT: 69 IN | HEART RATE: 74 BPM | OXYGEN SATURATION: 92 % | WEIGHT: 193.6 LBS | SYSTOLIC BLOOD PRESSURE: 123 MMHG

## 2023-03-15 DIAGNOSIS — R53.81 MALAISE AND FATIGUE: Primary | ICD-10-CM

## 2023-03-15 DIAGNOSIS — R06.09 DYSPNEA ON EXERTION: ICD-10-CM

## 2023-03-15 DIAGNOSIS — R53.83 MALAISE AND FATIGUE: Primary | ICD-10-CM

## 2023-03-15 LAB
ALBUMIN SERPL-MCNC: 4.1 G/DL (ref 3.5–5.2)
ALBUMIN/GLOB SERPL: 1.7 G/DL
ALP SERPL-CCNC: 61 U/L (ref 39–117)
ALT SERPL W P-5'-P-CCNC: 21 U/L (ref 1–41)
ANION GAP SERPL CALCULATED.3IONS-SCNC: 13.4 MMOL/L (ref 5–15)
AST SERPL-CCNC: 18 U/L (ref 1–40)
BACTERIA UR QL AUTO: ABNORMAL /HPF
BASOPHILS # BLD AUTO: 0.07 10*3/MM3 (ref 0–0.2)
BASOPHILS NFR BLD AUTO: 0.8 % (ref 0–1.5)
BILIRUB SERPL-MCNC: 0.5 MG/DL (ref 0–1.2)
BILIRUB UR QL STRIP: NEGATIVE
BUN SERPL-MCNC: 28 MG/DL (ref 8–23)
BUN/CREAT SERPL: 28.6 (ref 7–25)
CALCIUM SPEC-SCNC: 8.9 MG/DL (ref 8.6–10.5)
CHLORIDE SERPL-SCNC: 101 MMOL/L (ref 98–107)
CLARITY UR: CLEAR
CO2 SERPL-SCNC: 20.6 MMOL/L (ref 22–29)
COLOR UR: YELLOW
CREAT SERPL-MCNC: 0.98 MG/DL (ref 0.76–1.27)
D DIMER PPP FEU-MCNC: 0.62 MCGFEU/ML (ref 0–0.81)
DEPRECATED RDW RBC AUTO: 51 FL (ref 37–54)
EGFRCR SERPLBLD CKD-EPI 2021: 77.5 ML/MIN/1.73
EOSINOPHIL # BLD AUTO: 0.22 10*3/MM3 (ref 0–0.4)
EOSINOPHIL NFR BLD AUTO: 2.4 % (ref 0.3–6.2)
ERYTHROCYTE [DISTWIDTH] IN BLOOD BY AUTOMATED COUNT: 15.5 % (ref 12.3–15.4)
FLUAV RNA RESP QL NAA+PROBE: NOT DETECTED
FLUBV RNA RESP QL NAA+PROBE: NOT DETECTED
GLOBULIN UR ELPH-MCNC: 2.4 GM/DL
GLUCOSE SERPL-MCNC: 107 MG/DL (ref 65–99)
GLUCOSE UR STRIP-MCNC: ABNORMAL MG/DL
HCT VFR BLD AUTO: 43.9 % (ref 37.5–51)
HETEROPH AB SER QL LA: NEGATIVE
HGB BLD-MCNC: 14.4 G/DL (ref 13–17.7)
HGB UR QL STRIP.AUTO: ABNORMAL
HYALINE CASTS UR QL AUTO: ABNORMAL /LPF
IMM GRANULOCYTES # BLD AUTO: 0.02 10*3/MM3 (ref 0–0.05)
IMM GRANULOCYTES NFR BLD AUTO: 0.2 % (ref 0–0.5)
KETONES UR QL STRIP: NEGATIVE
LEUKOCYTE ESTERASE UR QL STRIP.AUTO: NEGATIVE
LYMPHOCYTES # BLD AUTO: 2.55 10*3/MM3 (ref 0.7–3.1)
LYMPHOCYTES NFR BLD AUTO: 28 % (ref 19.6–45.3)
MCH RBC QN AUTO: 30.1 PG (ref 26.6–33)
MCHC RBC AUTO-ENTMCNC: 32.8 G/DL (ref 31.5–35.7)
MCV RBC AUTO: 91.6 FL (ref 79–97)
MONOCYTES # BLD AUTO: 0.64 10*3/MM3 (ref 0.1–0.9)
MONOCYTES NFR BLD AUTO: 7 % (ref 5–12)
NEUTROPHILS NFR BLD AUTO: 5.61 10*3/MM3 (ref 1.7–7)
NEUTROPHILS NFR BLD AUTO: 61.6 % (ref 42.7–76)
NITRITE UR QL STRIP: NEGATIVE
NRBC BLD AUTO-RTO: 0 /100 WBC (ref 0–0.2)
NT-PROBNP SERPL-MCNC: 1973 PG/ML (ref 0–1800)
PH UR STRIP.AUTO: 5.5 [PH] (ref 4.5–8)
PLATELET # BLD AUTO: 188 10*3/MM3 (ref 140–450)
PMV BLD AUTO: 10 FL (ref 6–12)
POTASSIUM SERPL-SCNC: 4.2 MMOL/L (ref 3.5–5.2)
PROT SERPL-MCNC: 6.5 G/DL (ref 6–8.5)
PROT UR QL STRIP: NEGATIVE
QT INTERVAL: 412 MS
RBC # BLD AUTO: 4.79 10*6/MM3 (ref 4.14–5.8)
RBC # UR STRIP: ABNORMAL /HPF
REF LAB TEST METHOD: ABNORMAL
SARS-COV-2 RNA RESP QL NAA+PROBE: NOT DETECTED
SODIUM SERPL-SCNC: 135 MMOL/L (ref 136–145)
SP GR UR STRIP: 1.01 (ref 1–1.03)
SQUAMOUS #/AREA URNS HPF: ABNORMAL /HPF
TROPONIN T SERPL HS-MCNC: 15 NG/L
TROPONIN T SERPL HS-MCNC: 15 NG/L
UROBILINOGEN UR QL STRIP: ABNORMAL
WBC # UR STRIP: ABNORMAL /HPF
WBC NRBC COR # BLD: 9.11 10*3/MM3 (ref 3.4–10.8)

## 2023-03-15 PROCEDURE — 71046 X-RAY EXAM CHEST 2 VIEWS: CPT

## 2023-03-15 PROCEDURE — 81001 URINALYSIS AUTO W/SCOPE: CPT | Performed by: EMERGENCY MEDICINE

## 2023-03-15 PROCEDURE — 80053 COMPREHEN METABOLIC PANEL: CPT | Performed by: EMERGENCY MEDICINE

## 2023-03-15 PROCEDURE — 84484 ASSAY OF TROPONIN QUANT: CPT | Performed by: EMERGENCY MEDICINE

## 2023-03-15 PROCEDURE — 93005 ELECTROCARDIOGRAM TRACING: CPT | Performed by: EMERGENCY MEDICINE

## 2023-03-15 PROCEDURE — 85025 COMPLETE CBC W/AUTO DIFF WBC: CPT | Performed by: EMERGENCY MEDICINE

## 2023-03-15 PROCEDURE — 36415 COLL VENOUS BLD VENIPUNCTURE: CPT

## 2023-03-15 PROCEDURE — 93010 ELECTROCARDIOGRAM REPORT: CPT | Performed by: INTERNAL MEDICINE

## 2023-03-15 PROCEDURE — 85379 FIBRIN DEGRADATION QUANT: CPT | Performed by: EMERGENCY MEDICINE

## 2023-03-15 PROCEDURE — 86308 HETEROPHILE ANTIBODY SCREEN: CPT | Performed by: EMERGENCY MEDICINE

## 2023-03-15 PROCEDURE — 87636 SARSCOV2 & INF A&B AMP PRB: CPT | Performed by: EMERGENCY MEDICINE

## 2023-03-15 PROCEDURE — 99283 EMERGENCY DEPT VISIT LOW MDM: CPT

## 2023-03-15 PROCEDURE — 83880 ASSAY OF NATRIURETIC PEPTIDE: CPT | Performed by: EMERGENCY MEDICINE

## 2023-03-15 RX ORDER — SODIUM CHLORIDE 0.9 % (FLUSH) 0.9 %
10 SYRINGE (ML) INJECTION AS NEEDED
Status: DISCONTINUED | OUTPATIENT
Start: 2023-03-15 | End: 2023-03-15 | Stop reason: HOSPADM

## 2023-03-15 NOTE — ED PROVIDER NOTES
"Subjective     History provided by:  Patient    History of Present Illness    · Chief complaint: Fatigue and shortness of breath    · Location: Generalized fatigue    · Quality/Severity: The patient states he feels fatigued.  He has shortness of breath with exertion and becomes fatigued with exertion.    · Timing/Onset: Started about a month ago.    · Modifying Factors: Exertion exacerbates fatigue and shortness of breath.  He tested positive for COVID 3 weeks ago.    · Associated symptoms: Still has no taste.    · Narrative: The patient is an 82-year-old white male complaining of a 1 month history of fatigue and shortness of breath with exertion.  He tested positive for COVID 3 weeks ago and states he still has no taste.  He has a history of congestive heart failure and valvular heart disease.  He states he is on Lasix 40 mg daily and his fluid and sodium restricted.  His PCP is the [facility] clinic.  He saw his cardiologist last Friday (5 days ago) and states that he was told his lungs were clear and his heart was okay.  The patient states he had severe pain and spasm in his right foot and ankle about 10 days ago and [facility] \"Motrin\" and the pain went away, but has had persistent swelling of the right lower extremity.    Review of Systems  Past Medical History:   Diagnosis Date   • Anxiety    • Colon polyps    • Hyperlipidemia    • Hypertension    • Ischemic heart disease    • Tobacco use     Remote tobacco use. Quit in 1978.     /72   Pulse 74   Temp 97.7 °F (36.5 °C) (Oral)   Resp 20   Ht 175.3 cm (69\")   Wt 87.8 kg (193 lb 9.6 oz)   SpO2 92%   BMI 28.59 kg/m²     Past Medical History:   Diagnosis Date   • Anxiety    • Colon polyps    • Hyperlipidemia    • Hypertension    • Ischemic heart disease    • Tobacco use     Remote tobacco use. Quit in 1978.       Allergies   Allergen Reactions   • Pneumococcal Vac Polyvalent Other (See Comments)       Past " Surgical History:   Procedure Laterality Date   • CORONARY ANGIOPLASTY WITH STENT PLACEMENT Left 2010    70% RCA stenosis treated with 3.0 x 18 mm Cordis Cypher IRENA; LVEF 78%.    • TONSILLECTOMY         Family History   Problem Relation Age of Onset   • Osteoarthritis Mother    • Stroke Mother        Social History     Socioeconomic History   • Marital status:    Tobacco Use   • Smoking status: Former     Packs/day: 0.50     Years: 17.00     Pack years: 8.50     Types: Cigarettes     Start date:      Quit date: 1978     Years since quittin.8   • Smokeless tobacco: Never   Substance and Sexual Activity   • Alcohol use: Not Currently     Comment: rare   • Drug use: No   • Sexual activity: Defer           Objective   Physical Exam  Vitals and nursing note reviewed.   Constitutional:       General: He is not in acute distress.     Appearance: He is well-developed. He is not ill-appearing, toxic-appearing or diaphoretic.      Comments: The patient appears healthy in no acute distress.  Review of his vital signs: He is afebrile with a temperature of 97.7, respirations normal 16 with a normal room air oxygen saturation of 96%, heart rate 89, blood pressure slightly elevated 144/61.   HENT:      Head: Normocephalic and atraumatic.      Nose: Nose normal.      Mouth/Throat:      Pharynx: No oropharyngeal exudate.   Eyes:      General: No scleral icterus.        Right eye: No discharge.         Left eye: No discharge.      Pupils: Pupils are equal, round, and reactive to light.   Neck:      Thyroid: No thyromegaly.      Vascular: No JVD.   Cardiovascular:      Rate and Rhythm: Normal rate and regular rhythm.      Heart sounds: Murmur ( 3/6 holosystolic) heard.   Pulmonary:      Effort: Pulmonary effort is normal.      Breath sounds: Normal breath sounds. No decreased breath sounds, wheezing, rhonchi or rales.   Chest:      Chest wall: No tenderness.   Abdominal:      General: Bowel sounds are normal.  There is no distension.      Palpations: Abdomen is soft.      Tenderness: There is no abdominal tenderness.   Musculoskeletal:         General: No tenderness or deformity. Normal range of motion.      Cervical back: Normal range of motion and neck supple.      Comments: The patient's right lower leg is slightly more swollen than the left lower leg which is not swollen.   Lymphadenopathy:      Cervical: No cervical adenopathy.   Skin:     General: Skin is warm and dry.      Capillary Refill: Capillary refill takes less than 2 seconds.      Coloration: Skin is not pale.      Findings: No erythema or rash.   Neurological:      General: No focal deficit present.      Mental Status: He is alert and oriented to person, place, and time.      Cranial Nerves: No cranial nerve deficit.      Coordination: Coordination normal.      Comments: No focal motor sensory deficit   Psychiatric:         Mood and Affect: Mood normal.         Behavior: Behavior normal.         Thought Content: Thought content normal.         Judgment: Judgment normal.         Procedures           ED Course  ED Course as of 03/15/23 2231   Wed Mar 15, 2023   1743 Review of the patient's laboratory studies: His D-dimer is negative.  High sensitive troponin is slightly elevated in the indeterminate range of 15.  proBNP elevated at 1973 which is an increase from 5 months ago and was 723.  CBC is normal.  CMP has an elevated BUN at 28 with a normal creatinine of 0.98 and normal GFR 77.5 with an elevated BUN/creatinine ratio consistent with volume depleted.  LFTs within normal limits.  Sodium potassium within normal limits.  Chest x-ray was interpreted by me and the radiologist as no acute disease.  The patient is Monospot negative.  COVID/flu PCR's were done. [TP]   1744 My interpretation of the patient's EKG tracing performed 15: 57 is normal sinus rhythm with a rate of 80, normal axis, normal R wave transition, no acute ST segment elevation or depression  consistent with ischemia, prolonged WI interval consistent with first-degree AV block, borderline prolonged QT interval.  No change in comparison to tracing 9/26/2022. [TP]   1924 The patient's delta high-sensitivity troponin was unchanged at 15 in the low indeterminate range. [TP]   1924 I can find no abnormality on exam or with diagnostic studies to account for the patient's fatigue and shortness of breath.  His lungs are clear to auscultation and his oxygen saturations are normal.  He has no evidence of fluid overload.  His lab work actually implies that he is a little dry.  He will be [facility]. [TP]      ED Course User Index  [TP] Ac Chávez MD                                           Medical Decision Making  My differential diagnosis for dyspnea includes but is not limited to:  Asthma, COPD, pneumonia, pulmonary embolus, acute respiratory distress syndrome, pneumothorax, pleural effusion, pulmonary fibrosis, congestive heart failure, myocardial infarction, DKA, uremia, acidosis, sepsis, anemia, drug related, hyperventilation, CNS disease    Dyspnea on exertion: acute illness or injury  Malaise and fatigue: acute illness or injury  Amount and/or Complexity of Data Reviewed  Labs: ordered. Decision-making details documented in ED Course.  Radiology: ordered. Decision-making details documented in ED Course.  ECG/medicine tests: ordered. Decision-making details documented in ED Course.          Final diagnoses:   Malaise and fatigue   Dyspnea on exertion       ED Disposition  ED Disposition     ED Disposition   Discharge    Condition   Stable    Comment   --             Dr. Maya at the [facility] clinic    Schedule an appointment as soon as possible for a visit   next available         Medication List      No changes were made to your prescriptions during this visit.         Labs Reviewed   COMPREHENSIVE METABOLIC PANEL - Abnormal; Notable for the following components:        Result Value    Glucose 107 (*)     BUN 28 (*)     Sodium 135 (*)     CO2 20.6 (*)     BUN/Creatinine Ratio 28.6 (*)     All other components within normal limits    Narrative:     GFR Normal >60  Chronic Kidney Disease <60  Kidney Failure <15    The GFR formula is only valid for adults with stable renal function between ages 18 and 70.   URINALYSIS W/ MICROSCOPIC IF INDICATED (NO CULTURE) - Abnormal; Notable for the following components:    Glucose,  mg/dL (2+) (*)     Blood, UA Trace (*)     All other components within normal limits   SINGLE HSTROPONIN T - Abnormal; Notable for the following components:    HS Troponin T 15 (*)     All other components within normal limits    Narrative:     High Sensitive Troponin T Reference Range:  <10.0 ng/L- Negative Female for AMI  <15.0 ng/L- Negative Male for AMI  >=10 - Abnormal Female indicating possible myocardial injury.  >=15 - Abnormal Male indicating possible myocardial injury.   Clinicians would have to utilize clinical acumen, EKG, Troponin, and serial changes to determine if it is an Acute Myocardial Infarction or myocardial injury due to an underlying chronic condition.        BNP (IN-HOUSE) - Abnormal; Notable for the following components:    proBNP 1,973.0 (*)     All other components within normal limits    Narrative:     Among patients with dyspnea, NT-proBNP is highly sensitive for the detection of acute congestive heart failure. In addition NT-proBNP of <300 pg/ml effectively rules out acute congestive heart failure with 99% negative predictive value.    Results may be falsely decreased if patient taking Biotin.     CBC WITH AUTO DIFFERENTIAL - Abnormal; Notable for the following components:    RDW 15.5 (*)     All other components within normal limits   URINALYSIS, MICROSCOPIC ONLY - Abnormal; Notable for the following components:    RBC, UA 6-12 (*)     All other components within normal limits   SINGLE HSTROPONIN T - Abnormal; Notable for the  "following components:    HS Troponin T 15 (*)     All other components within normal limits    Narrative:     High Sensitive Troponin T Reference Range:  <10.0 ng/L- Negative Female for AMI  <15.0 ng/L- Negative Male for AMI  >=10 - Abnormal Female indicating possible myocardial injury.  >=15 - Abnormal Male indicating possible myocardial injury.   Clinicians would have to utilize clinical acumen, EKG, Troponin, and serial changes to determine if it is an Acute Myocardial Infarction or myocardial injury due to an underlying chronic condition.        COVID-19 AND FLU A/B, NP SWAB IN TRANSPORT MEDIA 8-12 HR TAT - Normal    Narrative:     Fact sheet for providers: https://www.fda.gov/media/083392/download    Fact sheet for patients: https://www.fda.gov/media/905497/download    Test performed by PCR.   D-DIMER, QUANTITATIVE - Normal    Narrative:     According to the assay 's published package insert, a normal (<0.50 MCGFEU/mL) D-dimer result in conjunction with a non-high clinical probability assessment, excludes deep vein thrombosis (DVT) and pulmonary embolism (PE) with high sensitivity.    D-dimer values increase with age and this can make VTE exclusion of an older population difficult. To address this, the [employer], based on best available evidence and recent guidelines, recommends that clinicians use age-adjusted D-dimer thresholds in patients greater than 50 years of age with: a) a low probability of PE who do not meet all Pulmonary Embolism Rule Out Criteria, or b) in those with intermediate probability of PE.   The formula for an age-adjusted D-dimer cut-off is \"age/100\".  For example, a 60 year old patient would have an age-adjusted cut-off of 0.60 MCGFEU/mL and an 80 year old 0.80 MCGFEU/mL.   MONONUCLEOSIS SCREEN - Normal   CBC AND DIFFERENTIAL    Narrative:     The following orders were created for panel order CBC & Differential.  Procedure                               " Abnormality         Status                     ---------                               -----------         ------                     CBC Auto Differential[[id number]]        Abnormal            Final result                 Please view results for these tests on the individual orders.     XR Chest 2 View   Final Result   No acute cardiopulmonary findings.      Signer Name: Rocky Dolan MD    Signed: 3/15/2023 5:36 PM    Workstation Name: [id number]     [facility]             Medication List      No changes were made to your prescriptions during this visit.              Ac Chávez MD  03/15/23 5615

## 2023-10-16 NOTE — PROGRESS NOTES
Physical Therapy Daily Progress Note    TOTAL TIME: 80 MINUTES    Jef Mcarthur reports: shoulder feeling good      Objective   See Exercise, Manual, and Modality Logs for complete treatment.     THERAPEUTIC EXERCISES/ACTIVITIES ADDED TODAY: supine chest press with 3#, supine circles CW,CCW with 3#, green tband D1 patterns    Assessment/Plan  PATIENT NEEDS CONTINUED PHYSICAL THERAPY IN ORDER TO ACHIEVE FULL ROM, STRENGTH AND FUNCTION WITH NO REPORTS OF PAIN IN ORDER TO RTW WITHOUT RESTRICTIONS AND WITHOUT PAIN OR DYSFUNCTION       Progress per Plan of Care           Manual Therapy:    10     mins  56108;  Therapeutic Exercise:    70     mins  86597;     Neuromuscular Rahel:        mins  94222;    Therapeutic Activity:          mins  33400;     Gait Training:           mins  05548;     Ultrasound:          mins  82726;    Electrical Stimulation:         mins  40084 ( );  Dry Needling          mins self-pay    Timed Treatment:   80   mins   Total Treatment:     80   mins    Melvin Rodgers, PT  Physical Therapist   Type Of Destruction Used: Curettage

## 2023-11-14 ENCOUNTER — LAB REQUISITION (OUTPATIENT)
Dept: LAB | Facility: HOSPITAL | Age: 83
End: 2023-11-14
Payer: OTHER GOVERNMENT

## 2023-11-14 DIAGNOSIS — I50.9 HEART FAILURE, UNSPECIFIED: ICD-10-CM

## 2023-11-14 PROCEDURE — 80048 BASIC METABOLIC PNL TOTAL CA: CPT | Performed by: NURSE PRACTITIONER

## 2023-11-15 LAB
ANION GAP SERPL CALCULATED.3IONS-SCNC: 16.6 MMOL/L (ref 5–15)
BUN SERPL-MCNC: 23 MG/DL (ref 8–23)
BUN/CREAT SERPL: 24.5 (ref 7–25)
CALCIUM SPEC-SCNC: 9.1 MG/DL (ref 8.6–10.5)
CHLORIDE SERPL-SCNC: 91 MMOL/L (ref 98–107)
CO2 SERPL-SCNC: 20.4 MMOL/L (ref 22–29)
CREAT SERPL-MCNC: 0.94 MG/DL (ref 0.76–1.27)
EGFRCR SERPLBLD CKD-EPI 2021: 80.9 ML/MIN/1.73
GLUCOSE SERPL-MCNC: 113 MG/DL (ref 65–99)
POTASSIUM SERPL-SCNC: 3.4 MMOL/L (ref 3.5–5.2)
SODIUM SERPL-SCNC: 128 MMOL/L (ref 136–145)

## 2023-12-01 ENCOUNTER — APPOINTMENT (OUTPATIENT)
Dept: GENERAL RADIOLOGY | Facility: HOSPITAL | Age: 83
End: 2023-12-01
Payer: OTHER GOVERNMENT

## 2023-12-01 ENCOUNTER — HOSPITAL ENCOUNTER (EMERGENCY)
Facility: HOSPITAL | Age: 83
Discharge: HOME | End: 2023-12-01
Attending: EMERGENCY MEDICINE
Payer: OTHER GOVERNMENT

## 2023-12-01 VITALS
HEIGHT: 69 IN | SYSTOLIC BLOOD PRESSURE: 140 MMHG | TEMPERATURE: 97.5 F | RESPIRATION RATE: 16 BRPM | BODY MASS INDEX: 27.11 KG/M2 | HEART RATE: 82 BPM | DIASTOLIC BLOOD PRESSURE: 74 MMHG | WEIGHT: 183 LBS | OXYGEN SATURATION: 95 %

## 2023-12-01 DIAGNOSIS — R07.89 ATYPICAL CHEST PAIN: Primary | ICD-10-CM

## 2023-12-01 DIAGNOSIS — E87.6 HYPOKALEMIA: ICD-10-CM

## 2023-12-01 LAB
ALBUMIN SERPL-MCNC: 4.4 G/DL (ref 3.5–5.2)
ALBUMIN/GLOB SERPL: 1.5 G/DL
ALP SERPL-CCNC: 94 U/L (ref 39–117)
ALT SERPL W P-5'-P-CCNC: 15 U/L (ref 1–41)
ANION GAP SERPL CALCULATED.3IONS-SCNC: 17.9 MMOL/L (ref 5–15)
AST SERPL-CCNC: 21 U/L (ref 1–40)
BASOPHILS # BLD AUTO: 0.07 10*3/MM3 (ref 0–0.2)
BASOPHILS NFR BLD AUTO: 0.8 % (ref 0–1.5)
BILIRUB SERPL-MCNC: 0.4 MG/DL (ref 0–1.2)
BUN SERPL-MCNC: 24 MG/DL (ref 8–23)
BUN/CREAT SERPL: 25.8 (ref 7–25)
CALCIUM SPEC-SCNC: 9.8 MG/DL (ref 8.6–10.5)
CHLORIDE SERPL-SCNC: 90 MMOL/L (ref 98–107)
CO2 SERPL-SCNC: 23.1 MMOL/L (ref 22–29)
CREAT SERPL-MCNC: 0.93 MG/DL (ref 0.76–1.27)
D DIMER PPP FEU-MCNC: 0.47 MCGFEU/ML (ref 0–0.82)
DEPRECATED RDW RBC AUTO: 44.1 FL (ref 37–54)
EGFRCR SERPLBLD CKD-EPI 2021: 82 ML/MIN/1.73
EOSINOPHIL # BLD AUTO: 0.21 10*3/MM3 (ref 0–0.4)
EOSINOPHIL NFR BLD AUTO: 2.3 % (ref 0.3–6.2)
ERYTHROCYTE [DISTWIDTH] IN BLOOD BY AUTOMATED COUNT: 13.8 % (ref 12.3–15.4)
FLUAV RNA RESP QL NAA+PROBE: NOT DETECTED
FLUBV RNA RESP QL NAA+PROBE: NOT DETECTED
GLOBULIN UR ELPH-MCNC: 3 GM/DL
GLUCOSE SERPL-MCNC: 98 MG/DL (ref 65–99)
HCT VFR BLD AUTO: 42.4 % (ref 37.5–51)
HGB BLD-MCNC: 14.3 G/DL (ref 13–17.7)
IMM GRANULOCYTES # BLD AUTO: 0.03 10*3/MM3 (ref 0–0.05)
IMM GRANULOCYTES NFR BLD AUTO: 0.3 % (ref 0–0.5)
LYMPHOCYTES # BLD AUTO: 2.39 10*3/MM3 (ref 0.7–3.1)
LYMPHOCYTES NFR BLD AUTO: 26.7 % (ref 19.6–45.3)
MAGNESIUM SERPL-MCNC: 2 MG/DL (ref 1.6–2.4)
MCH RBC QN AUTO: 29.4 PG (ref 26.6–33)
MCHC RBC AUTO-ENTMCNC: 33.7 G/DL (ref 31.5–35.7)
MCV RBC AUTO: 87.1 FL (ref 79–97)
MONOCYTES # BLD AUTO: 0.78 10*3/MM3 (ref 0.1–0.9)
MONOCYTES NFR BLD AUTO: 8.7 % (ref 5–12)
NEUTROPHILS NFR BLD AUTO: 5.48 10*3/MM3 (ref 1.7–7)
NEUTROPHILS NFR BLD AUTO: 61.2 % (ref 42.7–76)
NRBC BLD AUTO-RTO: 0 /100 WBC (ref 0–0.2)
PLATELET # BLD AUTO: 280 10*3/MM3 (ref 140–450)
PMV BLD AUTO: 10.2 FL (ref 6–12)
POTASSIUM SERPL-SCNC: 2.9 MMOL/L (ref 3.5–5.2)
PROT SERPL-MCNC: 7.4 G/DL (ref 6–8.5)
QT INTERVAL: 382 MS
QTC INTERVAL: 467 MS
RBC # BLD AUTO: 4.87 10*6/MM3 (ref 4.14–5.8)
SARS-COV-2 RNA RESP QL NAA+PROBE: NOT DETECTED
SODIUM SERPL-SCNC: 131 MMOL/L (ref 136–145)
TROPONIN T SERPL HS-MCNC: 27 NG/L
TROPONIN T SERPL HS-MCNC: 27 NG/L
WBC NRBC COR # BLD AUTO: 8.96 10*3/MM3 (ref 3.4–10.8)

## 2023-12-01 PROCEDURE — 93005 ELECTROCARDIOGRAM TRACING: CPT

## 2023-12-01 PROCEDURE — 85379 FIBRIN DEGRADATION QUANT: CPT | Performed by: EMERGENCY MEDICINE

## 2023-12-01 PROCEDURE — 80053 COMPREHEN METABOLIC PANEL: CPT | Performed by: EMERGENCY MEDICINE

## 2023-12-01 PROCEDURE — 87636 SARSCOV2 & INF A&B AMP PRB: CPT | Performed by: EMERGENCY MEDICINE

## 2023-12-01 PROCEDURE — 85025 COMPLETE CBC W/AUTO DIFF WBC: CPT | Performed by: EMERGENCY MEDICINE

## 2023-12-01 PROCEDURE — 36415 COLL VENOUS BLD VENIPUNCTURE: CPT

## 2023-12-01 PROCEDURE — 71045 X-RAY EXAM CHEST 1 VIEW: CPT

## 2023-12-01 PROCEDURE — 83735 ASSAY OF MAGNESIUM: CPT | Performed by: EMERGENCY MEDICINE

## 2023-12-01 PROCEDURE — 84484 ASSAY OF TROPONIN QUANT: CPT | Performed by: EMERGENCY MEDICINE

## 2023-12-01 PROCEDURE — 99284 EMERGENCY DEPT VISIT MOD MDM: CPT

## 2023-12-01 PROCEDURE — 93005 ELECTROCARDIOGRAM TRACING: CPT | Performed by: EMERGENCY MEDICINE

## 2023-12-01 RX ORDER — POTASSIUM CHLORIDE 20 MEQ/1
40 TABLET, EXTENDED RELEASE ORAL ONCE
Status: COMPLETED | OUTPATIENT
Start: 2023-12-01 | End: 2023-12-01

## 2023-12-01 RX ORDER — POTASSIUM CHLORIDE 20 MEQ/1
20 TABLET, EXTENDED RELEASE ORAL 2 TIMES DAILY
Qty: 6 TABLET | Refills: 0 | Status: SHIPPED | OUTPATIENT
Start: 2023-12-01 | End: 2023-12-04

## 2023-12-01 RX ADMIN — POTASSIUM CHLORIDE 40 MEQ: 1500 TABLET, EXTENDED RELEASE ORAL at 17:41

## 2023-12-01 RX ADMIN — APIXABAN 5 MG: 2.5 TABLET, FILM COATED ORAL at 18:38

## 2023-12-01 NOTE — ED PROVIDER NOTES
Subjective   History of Present Illness    Chief complaint: Chest pain    Location: Left-sided, nonradiating    Quality/Severity: 5/10 at its worst, 3/10 currently, sharp    Timing/Onset/Duration: Constant since 10:30 AM    Modifying Factors: Nothing makes it better    Associated Symptoms: Mild headache.  No fever chills.  Patient had a cough for the last 2 days.  The cough has been nonproductive.  Patient complains of some shortness of breath.  No diaphoresis.  Patient does have some dizziness.  No abdominal pain.  No diarrhea or burning when he urinates.  No nausea or vomiting.  No black or bloody stools.    Narrative: This 83-year-old presents with left-sided chest pressure that started 3 days ago.  Patient's been out of his Eliquis 3 days.  The patient has a history of ischemic heart disease, hypertension, smokes, dyslipidemia, angioplasty with stent.  The patient states that the chest pains started after he took meds at 1030 this morning.  Patient had a coronary artery bypass graft October 9, 2023 at [facility] by Dr. Quezada.    PCP:Phylis Brunner, [facility]    Cardiothoracic:  Damien    Cardiology: Shruthi        Review of Systems   Constitutional:  Negative for chills and fever.   HENT:  Negative for congestion, ear pain and sore throat.    Respiratory:  Positive for cough and shortness of breath.    Cardiovascular:  Positive for chest pain.   Gastrointestinal:  Negative for abdominal pain, diarrhea, nausea and vomiting.   Genitourinary:  Negative for difficulty urinating.   Musculoskeletal:  Negative for back pain.   Skin:  Negative for rash.   Neurological:  Negative for headaches.         Past Medical History:   Diagnosis Date    Anxiety     Colon polyps     Hyperlipidemia     Hypertension     Ischemic heart disease     Tobacco use     Remote tobacco use. Quit in 1978.       Allergies   Allergen Reactions    Pneumococcal Vac Polyvalent Other (See Comments)       Past Surgical  History:   Procedure Laterality Date    CORONARY ANGIOPLASTY WITH STENT PLACEMENT Left 2010    70% RCA stenosis treated with 3.0 x 18 mm Cordis Cypher IRENA; LVEF 78%.     TONSILLECTOMY         Family History   Problem Relation Age of Onset    Osteoarthritis Mother     Stroke Mother        Social History     Socioeconomic History    Marital status:    Tobacco Use    Smoking status: Former     Packs/day: 0.50     Years: 17.00     Additional pack years: 0.00     Total pack years: 8.50     Types: Cigarettes     Start date:      Quit date: 1978     Years since quittin.5    Smokeless tobacco: Never   Substance and Sexual Activity    Alcohol use: Not Currently     Comment: rare    Drug use: No    Sexual activity: Defer           Objective   Physical Exam  Vitals reviewed: The temperature is 97.5 °F, pulse 100, respirations 18, room air pulse ox 94%..   Constitutional:       Appearance: He is well-developed.   HENT:      Head: Normocephalic and atraumatic.   Cardiovascular:      Rate and Rhythm: Normal rate. Rhythm irregular.      Heart sounds: No murmur heard.     No friction rub. No gallop.   Pulmonary:      Effort: Pulmonary effort is normal.      Breath sounds: Normal breath sounds.   Chest:      Chest wall: No tenderness.   Abdominal:      General: Bowel sounds are normal.      Palpations: Abdomen is soft. There is no mass.      Tenderness: There is no abdominal tenderness. There is no guarding or rebound.   Musculoskeletal:         General: Normal range of motion.      Cervical back: Normal range of motion and neck supple.      Right lower leg: Edema (Trace) present.      Left lower leg: Edema (Trace) present.   Skin:     General: Skin is warm and dry.      Findings: No rash.   Neurological:      General: No focal deficit present.      Mental Status: He is alert and oriented to person, place, and time.       Procedures           ED Course  ED Course as of 12/01/23 1822   Fri Dec 01, 2023    1600 CBC is unremarkable. [RC]   1611 The BUN is 24, sodium 131, potassium 3.9, chloride 90, CMP otherwise unremarkable. [RC]   1611 Dimer is normal at 0.47. [RC]   1612 The potassium was 3.4 on 11/14/2023. [RC]   1613 The high-sensitivity troponin is 27 and elevated. [RC]   1632 COVID flu is negative.      Serum magnesium is 2.0 and normal. [RC]   1821 2-hour troponin is 27 with a delta T of 0. [RC]      ED Course User Index  [RC] Jef Gonzalez MD      15:11 EST, 12/01/23:  EKG was obtained at 1505 and read by me at 1507.  EKG shows a normal sinus rhythm with rate of 96.  There is a normal axis with no hypertrophy.  The UT, QT intervals are unremarkable.  The QRS is prolonged at 100.5 ms.  There are PACs.  There is nonspecific intraventricular conduction delay and nonspecific repolarization abnormality diffusely.  There is no acute ST elevation or depression.  There is artifact in multiple leads.  The EKG today is unchanged compared to EKG dated 3/15/23.                             18:26 EST, 12/01/23:  The patient was reassessed.  He has no new complaints.  His vital signs were reviewed and are stable.  [facility] 3-day supply of Eliquis.  The patient would like that called into the [facility] pharmacy.  Go ahead and give him a 5 mg dose of Eliquis here tonight in case she runs into difficulty picking up the prescription tonight.  Patient has a appointment with his cardiologist on December 6.  He has been encouraged to keep this appointment.  Patient's work-up is not concerning for acute coronary event.  His D-dimer is unremarkable.  His chest x-ray is unremarkable.  The patient will be discharged home in stable condition.  All the patient questions were answered he will be discharged in good condition.  Patient diagnosis is atypical chest pain and hypokalemia.  Patient will be given a prescription for potassium.           Medical Decision Making  Amount  and/or Complexity of Data Reviewed  ECG/medicine tests: ordered.        Final diagnoses:   Atypical chest pain   Hypokalemia       ED Disposition  ED Disposition       None            No follow-up provider specified.       Medication List      No changes were made to your prescriptions during this visit.            Jef Gonzalez MD  12/02/23 0022

## 2024-01-08 DIAGNOSIS — Z95.1 S/P CABG (CORONARY ARTERY BYPASS GRAFT): Primary | ICD-10-CM

## 2024-01-10 ENCOUNTER — TREATMENT (OUTPATIENT)
Dept: CARDIAC REHAB | Facility: HOSPITAL | Age: 84
End: 2024-01-10
Payer: OTHER GOVERNMENT

## 2024-01-10 DIAGNOSIS — Z95.1 S/P CABG (CORONARY ARTERY BYPASS GRAFT): Primary | ICD-10-CM

## 2024-01-10 PROCEDURE — 93797 PHYS/QHP OP CAR RHAB WO ECG: CPT

## 2024-01-10 PROCEDURE — 93798 PHYS/QHP OP CAR RHAB W/ECG: CPT

## 2024-01-12 ENCOUNTER — TREATMENT (OUTPATIENT)
Dept: CARDIAC REHAB | Facility: HOSPITAL | Age: 84
End: 2024-01-12
Payer: OTHER GOVERNMENT

## 2024-01-12 DIAGNOSIS — Z95.1 S/P CABG (CORONARY ARTERY BYPASS GRAFT): Primary | ICD-10-CM

## 2024-01-12 PROCEDURE — 93798 PHYS/QHP OP CAR RHAB W/ECG: CPT

## 2024-01-15 ENCOUNTER — TREATMENT (OUTPATIENT)
Dept: CARDIAC REHAB | Facility: HOSPITAL | Age: 84
End: 2024-01-15
Payer: OTHER GOVERNMENT

## 2024-01-15 DIAGNOSIS — Z95.1 S/P CABG (CORONARY ARTERY BYPASS GRAFT): Primary | ICD-10-CM

## 2024-01-15 PROCEDURE — 93798 PHYS/QHP OP CAR RHAB W/ECG: CPT

## 2024-01-17 ENCOUNTER — TREATMENT (OUTPATIENT)
Dept: CARDIAC REHAB | Facility: HOSPITAL | Age: 84
End: 2024-01-17
Payer: OTHER GOVERNMENT

## 2024-01-17 DIAGNOSIS — Z95.1 S/P CABG (CORONARY ARTERY BYPASS GRAFT): Primary | ICD-10-CM

## 2024-01-17 PROCEDURE — 93798 PHYS/QHP OP CAR RHAB W/ECG: CPT

## 2024-01-19 ENCOUNTER — APPOINTMENT (OUTPATIENT)
Dept: CARDIAC REHAB | Facility: HOSPITAL | Age: 84
End: 2024-01-19
Payer: OTHER GOVERNMENT

## 2024-01-22 ENCOUNTER — TREATMENT (OUTPATIENT)
Dept: CARDIAC REHAB | Facility: HOSPITAL | Age: 84
End: 2024-01-22
Payer: OTHER GOVERNMENT

## 2024-01-22 DIAGNOSIS — Z95.1 S/P CABG (CORONARY ARTERY BYPASS GRAFT): Primary | ICD-10-CM

## 2024-01-22 PROCEDURE — 93798 PHYS/QHP OP CAR RHAB W/ECG: CPT

## 2024-01-24 ENCOUNTER — TREATMENT (OUTPATIENT)
Dept: CARDIAC REHAB | Facility: HOSPITAL | Age: 84
End: 2024-01-24
Payer: OTHER GOVERNMENT

## 2024-01-24 ENCOUNTER — OFFICE VISIT (OUTPATIENT)
Dept: CARDIAC REHAB | Facility: HOSPITAL | Age: 84
End: 2024-01-24
Payer: OTHER GOVERNMENT

## 2024-01-24 DIAGNOSIS — Z95.1 S/P CABG (CORONARY ARTERY BYPASS GRAFT): Primary | ICD-10-CM

## 2024-01-24 PROCEDURE — 93798 PHYS/QHP OP CAR RHAB W/ECG: CPT

## 2024-01-24 PROCEDURE — 93797 PHYS/QHP OP CAR RHAB WO ECG: CPT

## 2024-01-26 ENCOUNTER — APPOINTMENT (OUTPATIENT)
Dept: CARDIAC REHAB | Facility: HOSPITAL | Age: 84
End: 2024-01-26
Payer: OTHER GOVERNMENT

## 2024-01-29 ENCOUNTER — TREATMENT (OUTPATIENT)
Dept: CARDIAC REHAB | Facility: HOSPITAL | Age: 84
End: 2024-01-29
Payer: OTHER GOVERNMENT

## 2024-01-29 DIAGNOSIS — Z95.1 S/P CABG (CORONARY ARTERY BYPASS GRAFT): Primary | ICD-10-CM

## 2024-01-29 PROCEDURE — 93798 PHYS/QHP OP CAR RHAB W/ECG: CPT

## 2024-01-31 ENCOUNTER — TREATMENT (OUTPATIENT)
Dept: CARDIAC REHAB | Facility: HOSPITAL | Age: 84
End: 2024-01-31
Payer: OTHER GOVERNMENT

## 2024-01-31 DIAGNOSIS — Z95.1 S/P CABG (CORONARY ARTERY BYPASS GRAFT): Primary | ICD-10-CM

## 2024-01-31 PROCEDURE — 93798 PHYS/QHP OP CAR RHAB W/ECG: CPT

## 2024-02-02 ENCOUNTER — TREATMENT (OUTPATIENT)
Dept: CARDIAC REHAB | Facility: HOSPITAL | Age: 84
End: 2024-02-02
Payer: OTHER GOVERNMENT

## 2024-02-02 DIAGNOSIS — Z95.1 S/P CABG (CORONARY ARTERY BYPASS GRAFT): Primary | ICD-10-CM

## 2024-02-02 PROCEDURE — 93798 PHYS/QHP OP CAR RHAB W/ECG: CPT

## 2024-02-05 ENCOUNTER — TREATMENT (OUTPATIENT)
Dept: CARDIAC REHAB | Facility: HOSPITAL | Age: 84
End: 2024-02-05
Payer: OTHER GOVERNMENT

## 2024-02-05 DIAGNOSIS — Z95.1 S/P CABG (CORONARY ARTERY BYPASS GRAFT): Primary | ICD-10-CM

## 2024-02-05 PROCEDURE — 93798 PHYS/QHP OP CAR RHAB W/ECG: CPT

## 2024-02-07 ENCOUNTER — TREATMENT (OUTPATIENT)
Dept: CARDIAC REHAB | Facility: HOSPITAL | Age: 84
End: 2024-02-07
Payer: OTHER GOVERNMENT

## 2024-02-07 DIAGNOSIS — Z95.1 S/P CABG (CORONARY ARTERY BYPASS GRAFT): Primary | ICD-10-CM

## 2024-02-07 PROCEDURE — 93798 PHYS/QHP OP CAR RHAB W/ECG: CPT

## 2024-02-09 ENCOUNTER — TREATMENT (OUTPATIENT)
Dept: CARDIAC REHAB | Facility: HOSPITAL | Age: 84
End: 2024-02-09
Payer: OTHER GOVERNMENT

## 2024-02-09 DIAGNOSIS — Z95.1 S/P CABG (CORONARY ARTERY BYPASS GRAFT): Primary | ICD-10-CM

## 2024-02-09 PROCEDURE — 93798 PHYS/QHP OP CAR RHAB W/ECG: CPT

## 2024-02-12 ENCOUNTER — TREATMENT (OUTPATIENT)
Dept: CARDIAC REHAB | Facility: HOSPITAL | Age: 84
End: 2024-02-12
Payer: OTHER GOVERNMENT

## 2024-02-12 DIAGNOSIS — Z95.1 S/P CABG (CORONARY ARTERY BYPASS GRAFT): Primary | ICD-10-CM

## 2024-02-12 PROCEDURE — 93798 PHYS/QHP OP CAR RHAB W/ECG: CPT

## 2024-02-14 ENCOUNTER — TREATMENT (OUTPATIENT)
Dept: CARDIAC REHAB | Facility: HOSPITAL | Age: 84
End: 2024-02-14
Payer: OTHER GOVERNMENT

## 2024-02-14 DIAGNOSIS — Z95.1 S/P CABG (CORONARY ARTERY BYPASS GRAFT): Primary | ICD-10-CM

## 2024-02-14 PROCEDURE — 93798 PHYS/QHP OP CAR RHAB W/ECG: CPT

## 2024-02-16 ENCOUNTER — APPOINTMENT (OUTPATIENT)
Dept: CARDIAC REHAB | Facility: HOSPITAL | Age: 84
End: 2024-02-16
Payer: OTHER GOVERNMENT

## 2024-02-19 ENCOUNTER — TREATMENT (OUTPATIENT)
Dept: CARDIAC REHAB | Facility: HOSPITAL | Age: 84
End: 2024-02-19
Payer: OTHER GOVERNMENT

## 2024-02-19 DIAGNOSIS — Z95.1 S/P CABG (CORONARY ARTERY BYPASS GRAFT): Primary | ICD-10-CM

## 2024-02-19 PROCEDURE — 93798 PHYS/QHP OP CAR RHAB W/ECG: CPT

## 2024-02-21 ENCOUNTER — TREATMENT (OUTPATIENT)
Dept: CARDIAC REHAB | Facility: HOSPITAL | Age: 84
End: 2024-02-21
Payer: OTHER GOVERNMENT

## 2024-02-21 DIAGNOSIS — Z95.1 S/P CABG (CORONARY ARTERY BYPASS GRAFT): Primary | ICD-10-CM

## 2024-02-21 PROCEDURE — 93798 PHYS/QHP OP CAR RHAB W/ECG: CPT

## 2024-02-23 ENCOUNTER — TREATMENT (OUTPATIENT)
Dept: CARDIAC REHAB | Facility: HOSPITAL | Age: 84
End: 2024-02-23
Payer: OTHER GOVERNMENT

## 2024-02-23 DIAGNOSIS — Z95.1 S/P CABG (CORONARY ARTERY BYPASS GRAFT): Primary | ICD-10-CM

## 2024-02-23 PROCEDURE — 93798 PHYS/QHP OP CAR RHAB W/ECG: CPT

## 2024-02-26 ENCOUNTER — TREATMENT (OUTPATIENT)
Dept: CARDIAC REHAB | Facility: HOSPITAL | Age: 84
End: 2024-02-26
Payer: OTHER GOVERNMENT

## 2024-02-26 DIAGNOSIS — Z95.1 S/P CABG (CORONARY ARTERY BYPASS GRAFT): Primary | ICD-10-CM

## 2024-02-26 PROCEDURE — 93798 PHYS/QHP OP CAR RHAB W/ECG: CPT

## 2024-02-28 ENCOUNTER — TREATMENT (OUTPATIENT)
Dept: CARDIAC REHAB | Facility: HOSPITAL | Age: 84
End: 2024-02-28
Payer: OTHER GOVERNMENT

## 2024-02-28 DIAGNOSIS — Z95.1 S/P CABG (CORONARY ARTERY BYPASS GRAFT): Primary | ICD-10-CM

## 2024-02-28 PROCEDURE — 93798 PHYS/QHP OP CAR RHAB W/ECG: CPT

## 2024-03-01 ENCOUNTER — TREATMENT (OUTPATIENT)
Dept: CARDIAC REHAB | Facility: HOSPITAL | Age: 84
End: 2024-03-01
Payer: OTHER GOVERNMENT

## 2024-03-01 DIAGNOSIS — Z95.1 S/P CABG (CORONARY ARTERY BYPASS GRAFT): Primary | ICD-10-CM

## 2024-03-01 PROCEDURE — 93798 PHYS/QHP OP CAR RHAB W/ECG: CPT

## 2024-03-04 ENCOUNTER — TREATMENT (OUTPATIENT)
Dept: CARDIAC REHAB | Facility: HOSPITAL | Age: 84
End: 2024-03-04
Payer: OTHER GOVERNMENT

## 2024-03-04 DIAGNOSIS — Z95.1 S/P CABG (CORONARY ARTERY BYPASS GRAFT): Primary | ICD-10-CM

## 2024-03-04 PROCEDURE — 93798 PHYS/QHP OP CAR RHAB W/ECG: CPT

## 2024-03-06 ENCOUNTER — APPOINTMENT (OUTPATIENT)
Dept: CARDIAC REHAB | Facility: HOSPITAL | Age: 84
End: 2024-03-06
Payer: OTHER GOVERNMENT

## 2024-03-08 ENCOUNTER — TREATMENT (OUTPATIENT)
Dept: CARDIAC REHAB | Facility: HOSPITAL | Age: 84
End: 2024-03-08
Payer: OTHER GOVERNMENT

## 2024-03-08 DIAGNOSIS — Z95.1 S/P CABG (CORONARY ARTERY BYPASS GRAFT): Primary | ICD-10-CM

## 2024-03-08 PROCEDURE — 93798 PHYS/QHP OP CAR RHAB W/ECG: CPT

## 2024-03-11 ENCOUNTER — TREATMENT (OUTPATIENT)
Dept: CARDIAC REHAB | Facility: HOSPITAL | Age: 84
End: 2024-03-11
Payer: OTHER GOVERNMENT

## 2024-03-11 DIAGNOSIS — Z95.1 S/P CABG (CORONARY ARTERY BYPASS GRAFT): Primary | ICD-10-CM

## 2024-03-11 PROCEDURE — 93798 PHYS/QHP OP CAR RHAB W/ECG: CPT

## 2024-03-13 ENCOUNTER — OFFICE VISIT (OUTPATIENT)
Dept: CARDIAC REHAB | Facility: HOSPITAL | Age: 84
End: 2024-03-13
Payer: OTHER GOVERNMENT

## 2024-03-13 ENCOUNTER — TREATMENT (OUTPATIENT)
Dept: CARDIAC REHAB | Facility: HOSPITAL | Age: 84
End: 2024-03-13
Payer: OTHER GOVERNMENT

## 2024-03-13 DIAGNOSIS — Z95.1 S/P CABG (CORONARY ARTERY BYPASS GRAFT): Primary | ICD-10-CM

## 2024-03-13 PROCEDURE — 93798 PHYS/QHP OP CAR RHAB W/ECG: CPT

## 2024-03-15 ENCOUNTER — APPOINTMENT (OUTPATIENT)
Dept: CARDIAC REHAB | Facility: HOSPITAL | Age: 84
End: 2024-03-15
Payer: OTHER GOVERNMENT

## 2024-03-18 ENCOUNTER — APPOINTMENT (OUTPATIENT)
Dept: CARDIAC REHAB | Facility: HOSPITAL | Age: 84
End: 2024-03-18
Payer: OTHER GOVERNMENT

## 2024-03-20 ENCOUNTER — APPOINTMENT (OUTPATIENT)
Dept: CARDIAC REHAB | Facility: HOSPITAL | Age: 84
End: 2024-03-20
Payer: OTHER GOVERNMENT

## 2024-03-22 ENCOUNTER — APPOINTMENT (OUTPATIENT)
Dept: CARDIAC REHAB | Facility: HOSPITAL | Age: 84
End: 2024-03-22
Payer: OTHER GOVERNMENT

## 2024-03-25 ENCOUNTER — APPOINTMENT (OUTPATIENT)
Dept: CARDIAC REHAB | Facility: HOSPITAL | Age: 84
End: 2024-03-25
Payer: OTHER GOVERNMENT

## 2024-03-27 ENCOUNTER — APPOINTMENT (OUTPATIENT)
Dept: CARDIAC REHAB | Facility: HOSPITAL | Age: 84
End: 2024-03-27
Payer: OTHER GOVERNMENT

## 2024-03-29 ENCOUNTER — APPOINTMENT (OUTPATIENT)
Dept: CARDIAC REHAB | Facility: HOSPITAL | Age: 84
End: 2024-03-29
Payer: OTHER GOVERNMENT

## 2024-08-14 NOTE — PROGRESS NOTES
[facility] Note    Subjective     Chief Complaint   Patient presents with   • Left Shoulder - Pain        HPI      Jef Mcarthur is a 77 y.o. male.  He is left-hand dominant.  He had no pre-existing shoulder pain until a fall at work on September 6, 2017.  Now he has restriction of motion and weakness.  He is unable to lift heavy objects.  His shoulders better with heat and ice and rest.  He is on no pain medicine.  Pain is 7 out of 10.  It is aching.  He has tried Aleve and physical therapy at [facility].  He is a  and lifts objects 30-50 pounds        Past Medical History:   Diagnosis Date   • Hyperlipidemia    • Hypertension    • Ischemic heart disease    • Tobacco use     Remote tobacco use. Quit in 1978.      Past Surgical History:   Procedure Laterality Date   • CORONARY ANGIOPLASTY WITH STENT PLACEMENT Left 07/16/2010    70% RCA stenosis treated with 3.0 x 18 mm Cordis Cypher IRENA; LVEF 78%.       Family History   Problem Relation Age of Onset   • Osteoarthritis Mother    • Stroke Mother      Social History     Social History   • Marital status:      Spouse name: N/A   • Number of children: N/A   • Years of education: N/A     Occupational History   • Not on file.     Social History Main Topics   • Smoking status: Former Smoker     Packs/day: 0.50     Years: 17.00     Types: Cigarettes     Start date: 1961     Quit date: 6/1/1978   • Smokeless tobacco: Never Used   • Alcohol use Yes      Comment: rare   • Drug use: No   • Sexual activity: Defer     Other Topics Concern   • Not on file     Social History Narrative      Current Outpatient Prescriptions on File Prior to Visit   Medication Sig Dispense Refill   • aspirin 81 MG EC tablet Take 81 mg by mouth daily.     • lisinopril-hydrochlorothiazide (PRINZIDE,ZESTORETIC) 20-12.5 MG per tablet Take 1 tablet by mouth Daily. 30 tablet 1   • pantoprazole (PROTONIX) 40 MG EC tablet TAKE ONE TABLET BY MOUTH DAILY 30 tablet 4  Detail Level: Zone "  • simvastatin (ZOCOR) 20 MG tablet TAKE ONE TABLET BY MOUTH DAILY 30 tablet 1   • naproxen (NAPROSYN) 500 MG tablet Take 1 tablet by mouth 2 (Two) Times a Day As Needed for Mild Pain . 24 tablet 0     No current facility-administered medications on file prior to visit.       No Known Allergies     The following portions of the patient's history were reviewed and updated as appropriate: allergies, current medications, past family history, past medical history, past social history, past surgical history and problem list.    Review of Systems   Constitutional: Positive for activity change.   HENT: Negative.    Eyes: Positive for redness.   Respiratory: Negative.    Cardiovascular: Negative.    Gastrointestinal: Negative.    Endocrine: Negative.    Genitourinary: Negative.    Musculoskeletal: Positive for arthralgias.   Skin: Negative.    Allergic/Immunologic: Positive for environmental allergies.   Neurological: Positive for weakness (Lt arm).   Hematological: Negative.    Psychiatric/Behavioral: Negative.         Objective      Physical Exam  /72  Pulse 69  Ht 67\" (170.2 cm)  Wt 213 lb (96.6 kg)  BMI 33.36 kg/m2    Body mass index is 33.36 kg/(m^2).        GENERAL APPEARANCE: awake, alert & oriented x 3, in no acute distress and well developed, well nourished  PSYCH: normal mood andaffect  LUNGS:  breathing nonlabored, no wheezing  EYES: sclera anicteric, pupils equal  CARDIOVASCULAR: palpable pulses dorsalis pedis, palpable posterior tibial bilaterally. Capillary refill less than 2 seconds  INTEGUMENTARY: skin intact, no clubbing, cyanosis  NEUROLOGIC:  Normal gait and balance            Ortho Exam  Musculoskeletal   Upper Extremity   Left Shoulder     Inspection and Palpation:     Tenderness - none     Crepitus - none    Sensation is normal    Examination reveals no ecchymosis.      Strength and Tone:    Supraspinatus - 4/5    Infraspinatus - 4/5    Subscapularis - 5/5    Deltoid - 5/5   Range of " Motion   Right shoulder:    Internal Rotation: ROM - T7    External Rotation: AROM - 80 degrees    Elevation through flexion: AROM - 180 degrees     Abduction - 180   Left Shoulder:    Internal Rotation: ROM - T7    External Rotation: AROM - 80 degrees    Elevation through flexion: AROM - 160 degrees     Abduction - 160 degrees   Instability   Left shoulder    Sulcus sign negative    Apprehension test negative    Meri relocation test negative    Jerk test negative   Impingement   Left shoulder    Vaz impingement test positive    Neer impingement test positive   Functional Testing   Left shoulder    AC crossover adduction test negative    Abdominal compression test negative    Lift-off sign negative    Speed's test negative    Fatima's test negative    Horriblower's sign negative     Imaging/Studies  Imaging Results (last 24 hours)     Procedure Component Value Units Date/Time    XR Shoulder 2+ View Left [[id number]] Resulted:  10/30/17 1005     Updated:  10/30/17 1005    Narrative:       Left Shoulder X-Ray  Indication: Pain  AP, scapular Y, and axillary lateral views    Findings:  Superior head migration with AC joint arthritis.  No bony lesion  Normal soft tissues      No prior studies were available for comparison.          I reviewed his MRI from [facility] which shows a massive rotator cuff tear involving supraspinatus and infraspinatus torn to the level of the acromioclavicular joint.  There is superior head migration.  With advanced acromioclavicular joint arthritis    Assessment/Plan      Jef was seen today for pain.    Diagnoses and all orders for this visit:    Pain  -     XR Shoulder 2+ View Left    Rupture of left supraspinatus tendon, initial encounter  -     Ambulatory Referral to Physical Therapy        He has a left shoulder massive retracted rotator cuff tear with superior head migration.  Based upon his age he has a high failure rate with any attempt at repair.  Because he has  reasonably good motion recommend continued physical therapy and I will see him back in 3 weeks.  No matter what he is going to need permanent restrictions with the left arm and most likely will never get back to lifting 50 pounds to shoulder level again.  When I see him back in 3 weeks if he is not improving I would recommend left shoulder arthroscopy and rotator cuff repair as a potential salvage surgery he may need a reverse shoulder arthroplasty due to the massive retracted tear and superior head migration that is already present.    Medical Decision Making  Management Options : over-the-counter medicine and physical/occupational therapy  Data/Risk: radiology tests and independent visualization of imaging, lab tests, or EMG/NCV    Heladio Ochoa MD  10/30/17  10:05 AM             Detail Level: Detailed

## 2024-10-15 NOTE — PROGRESS NOTES
Subjective   History of Present Illness: Jef Mcarthur is a 84 y.o. male is being seen for consultation today at the request of ODELL Brown for a right sided lumbar radiculopathy. MRI Lumbar done at [facility] 8/19/24.  He reports that over the past 20 years he has had intermittent lower back pain and right lower extremity pain.  He reports that over the past year the right lower extremity pain has improved.  He reports he has more pain with activity and improves with rest.  He also reports some pain while sleeping.  He reports weakness in his right leg when walking.  His right leg gives out at times.  Uses a cane for assistance when walking longer distances.    History of Present Illness    The following portions of the patient's history were reviewed and updated as appropriate: allergies, current medications, past family history, past medical history, past social history, past surgical history, and problem list.    Past Medical History:   Diagnosis Date    Anxiety     Colon polyps     Hyperlipidemia     Hypertension     Ischemic heart disease     Tobacco use     Remote tobacco use. Quit in 1978.        Past Surgical History:   Procedure Laterality Date    CORONARY ANGIOPLASTY WITH STENT PLACEMENT Left 07/16/2010    70% RCA stenosis treated with 3.0 x 18 mm Cordis Cypher IRENA; LVEF 78%.     TONSILLECTOMY            Current Outpatient Medications:     acetaminophen (TYLENOL) 500 MG tablet, Take 2 tablets by mouth Daily., Disp: , Rfl:     aspirin 81 MG EC tablet, Take 1 tablet by mouth Daily., Disp: , Rfl:     furosemide (LASIX) 40 MG tablet, See Instructions, Tab, 1 Tab Oral Daily x 5 days, then take 1 tab oral daily x 5 days, then take 1 tab oral daily as needed for shortness of breath, leg swelling, # 30 Tab, 0 Refill(s), Disp: , Rfl:     hydroCHLOROthiazide (HYDRODIURIL) 25 MG tablet, Take 1 tablet by mouth Daily., Disp: 90 tablet, Rfl: 1    metoprolol succinate XL (TOPROL-XL) 25 MG 24 hr tablet,  "Take 1 tablet by mouth Daily., Disp: 90 tablet, Rfl: 1    omeprazole (PrilOSEC) 20 MG capsule, Take 1 capsule by mouth Every Other Day As Needed (GERD)., Disp: 45 capsule, Rfl: 1    simvastatin (ZOCOR) 20 MG tablet, Take 1 tablet by mouth Daily., Disp: 90 tablet, Rfl: 1    apixaban (ELIQUIS) 5 MG tablet tablet, Take 1 tablet by mouth Daily., Disp: , Rfl:     apixaban (ELIQUIS) 5 MG tablet tablet, Take 1 tablet by mouth Daily., Disp: 3 tablet, Rfl: 0     Allergies   Allergen Reactions    Pneumococcal Vac Polyvalent Other (See Comments)        Social History     Socioeconomic History    Marital status:    Tobacco Use    Smoking status: Former     Current packs/day: 0.00     Average packs/day: 0.5 packs/day for 17.4 years (8.7 ttl pk-yrs)     Types: Cigarettes     Start date:      Quit date: 1978     Years since quittin.4    Smokeless tobacco: Never   Substance and Sexual Activity    Alcohol use: Not Currently     Comment: rare    Drug use: No    Sexual activity: Defer        Family History   Problem Relation Age of Onset    Osteoarthritis Mother     Stroke Mother         Review of Systems   Constitutional:  Negative for chills and fever.   Gastrointestinal:  Positive for constipation.   Genitourinary:  Negative for difficulty urinating, frequency, testicular pain and urgency.   Musculoskeletal:  Positive for back pain and gait problem.   Neurological:  Negative for weakness and numbness.   All other systems reviewed and are negative.      Objective     Vitals:    10/18/24 1059   BP: 136/68   Pulse: 74   Temp: 97.1 °F (36.2 °C)   SpO2: 98%   Weight: 86.2 kg (190 lb)   Height: 175.3 cm (69\")     Body mass index is 28.06 kg/m².      Physical Exam  Awake, alert, oriented x3  Face symmetric  Speech is fluent and clear  No pronator drift  Motor exam  Bilateral deltoids 5/5, bilateral biceps 5/5, bilateral triceps 5/5, bilateral wrist extension 5/5 bilateral hand  5/5  Bilateral hip flexion 5/5, " bilateral knee extension 5/5, bilateral DF/PF 5/5  No clonus  No Bernardo's reflex  Steady normal gait  Able to detect  light touch in all 4 extremities        Assessment & Plan   Independent Review of Radiographic Studies:      I personally reviewed the images from the following studies.  MRI of lumbar spine from 2024  The MRI images were reviewed and demonstrate severe degenerative changes throughout the lumbar spine with severe canal stenosis at L2-3 and L4-5 and L5-S1.  There is also a grade 1 spondylolisthesis at each of these levels.    Medical Decision Makin-year-old male with a 20+ year history of lower back pain and lower extremity pain and weakness  -He reports that over the past year he has had difficulty walking due to his right leg giving out.  I reviewed the MRI images with him and he has severe lumbar stenosis at L2-3, L4-5, L5-S1.  I have recommended that he try another round of physical therapy and pain management.  If he has increased difficulty walking he could consider a lumbar laminectomy at these levels.  There is a small chance that with the spondylolisthesis he could develop instability and require subsequent fusion.  He would also be considered high risk for surgery due to his advanced age and history of coronary artery disease.  -I will plan to see him back in 6 weeks with a x-ray with flexion-extension to evaluate for any abnormal motion or subluxation and to evaluate his progress  Diagnoses and all orders for this visit:    1. Spinal stenosis, lumbar region, with neurogenic claudication (Primary)  -     Ambulatory Referral to Physical Therapy for Evaluation & Treatment  -     Ambulatory Referral to Pain Management Clinic  -     XR Spine Lumbar Complete With Flex & Ext; Future    2. Back pain with sciatica  -     Ambulatory Referral to Physical Therapy for Evaluation & Treatment  -     Ambulatory Referral to Pain Management Clinic  -     XR Spine Lumbar Complete With  Flex & Ext; Future      Return in about 6 weeks (around 11/29/2024).    I spent 45 minutes reviewing the medical record, reviewing the MRI images, discussing management of back pain, discussing the management of lumbar stenosis, discussing the risks and benefits of surgery

## 2024-10-18 ENCOUNTER — OFFICE VISIT (OUTPATIENT)
Dept: NEUROSURGERY | Facility: CLINIC | Age: 84
End: 2024-10-18
Payer: OTHER GOVERNMENT

## 2024-10-18 VITALS
DIASTOLIC BLOOD PRESSURE: 68 MMHG | WEIGHT: 190 LBS | BODY MASS INDEX: 28.14 KG/M2 | OXYGEN SATURATION: 98 % | SYSTOLIC BLOOD PRESSURE: 136 MMHG | TEMPERATURE: 97.1 F | HEIGHT: 69 IN | HEART RATE: 74 BPM

## 2024-10-18 DIAGNOSIS — M54.9 BACK PAIN WITH SCIATICA: ICD-10-CM

## 2024-10-18 DIAGNOSIS — M54.30 BACK PAIN WITH SCIATICA: ICD-10-CM

## 2024-10-18 DIAGNOSIS — M48.062 SPINAL STENOSIS, LUMBAR REGION, WITH NEUROGENIC CLAUDICATION: Primary | ICD-10-CM

## 2024-10-18 PROCEDURE — 99204 OFFICE O/P NEW MOD 45 MIN: CPT | Performed by: NEUROLOGICAL SURGERY

## 2024-10-18 RX ORDER — FUROSEMIDE 40 MG
TABLET ORAL
COMMUNITY
Start: 2023-10-06

## 2024-11-20 ENCOUNTER — TELEPHONE (OUTPATIENT)
Dept: NEUROSURGERY | Facility: CLINIC | Age: 84
End: 2024-11-20

## 2024-11-20 NOTE — TELEPHONE ENCOUNTER
The [facility] received a fax that requires your attention. The document has been indexed to the patient’s chart for your review.      Reason for sending: NOTICE OF DENIAL W/ RQST OF MED RECS & PHYSCN SIGNATURE ON COMPLETED RFS FORM    Documents Description: DENIED - [facility] AUTH [PAIN [facility]]_[facility]_11.18.24     Name of Sender: SUGAR MILLER LPN @ [facility]    Date Indexed: 11/20/24    Notes (if needed):